# Patient Record
Sex: FEMALE | Race: WHITE | NOT HISPANIC OR LATINO | Employment: STUDENT | URBAN - METROPOLITAN AREA
[De-identification: names, ages, dates, MRNs, and addresses within clinical notes are randomized per-mention and may not be internally consistent; named-entity substitution may affect disease eponyms.]

---
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
## 2018-01-14 VITALS — HEART RATE: 80 BPM | TEMPERATURE: 98.1 F | RESPIRATION RATE: 16 BRPM | WEIGHT: 144.6 LBS

## 2018-06-04 RX ORDER — EPINEPHRINE 0.3 MG/.3ML
INJECTION SUBCUTANEOUS
COMMUNITY

## 2018-06-04 RX ORDER — ESCITALOPRAM OXALATE 20 MG/1
20 TABLET ORAL DAILY
Refills: 0 | COMMUNITY
Start: 2018-05-10 | End: 2021-12-23

## 2018-06-04 RX ORDER — LEVONORGESTREL AND ETHINYL ESTRADIOL 0.1-0.02MG
KIT ORAL
COMMUNITY
End: 2019-06-21 | Stop reason: ALTCHOICE

## 2018-06-04 RX ORDER — ALBUTEROL SULFATE 90 UG/1
AEROSOL, METERED RESPIRATORY (INHALATION)
COMMUNITY
End: 2018-06-05 | Stop reason: SDUPTHER

## 2018-06-05 ENCOUNTER — OFFICE VISIT (OUTPATIENT)
Dept: FAMILY MEDICINE CLINIC | Facility: CLINIC | Age: 18
End: 2018-06-05
Payer: COMMERCIAL

## 2018-06-05 VITALS
RESPIRATION RATE: 16 BRPM | HEART RATE: 96 BPM | SYSTOLIC BLOOD PRESSURE: 120 MMHG | HEIGHT: 62 IN | OXYGEN SATURATION: 97 % | BODY MASS INDEX: 30 KG/M2 | DIASTOLIC BLOOD PRESSURE: 84 MMHG | TEMPERATURE: 98.3 F | WEIGHT: 163 LBS

## 2018-06-05 DIAGNOSIS — R10.11 RIGHT UPPER QUADRANT ABDOMINAL PAIN: ICD-10-CM

## 2018-06-05 DIAGNOSIS — J45.990 EXERCISE-INDUCED ASTHMA: ICD-10-CM

## 2018-06-05 DIAGNOSIS — E66.3 OVERWEIGHT: Primary | ICD-10-CM

## 2018-06-05 DIAGNOSIS — L70.9 ACNE, UNSPECIFIED ACNE TYPE: ICD-10-CM

## 2018-06-05 DIAGNOSIS — G43.909 MIGRAINE WITHOUT STATUS MIGRAINOSUS, NOT INTRACTABLE, UNSPECIFIED MIGRAINE TYPE: ICD-10-CM

## 2018-06-05 PROCEDURE — 99204 OFFICE O/P NEW MOD 45 MIN: CPT | Performed by: INTERNAL MEDICINE

## 2018-06-05 PROCEDURE — 1036F TOBACCO NON-USER: CPT | Performed by: INTERNAL MEDICINE

## 2018-06-05 RX ORDER — ALBUTEROL SULFATE 90 UG/1
2 AEROSOL, METERED RESPIRATORY (INHALATION) EVERY 4 HOURS PRN
Qty: 18 G | Refills: 0 | Status: SHIPPED | OUTPATIENT
Start: 2018-06-05

## 2018-06-05 RX ORDER — ERYTHROMYCIN 20 MG/G
GEL TOPICAL 2 TIMES DAILY
Qty: 60 G | Refills: 2 | Status: SHIPPED | OUTPATIENT
Start: 2018-06-05 | End: 2019-01-10 | Stop reason: ALTCHOICE

## 2018-06-05 NOTE — PROGRESS NOTES
Assessment/Plan:         Diagnoses and all orders for this visit:    Overweight  Comments:  check lab/continue with weight watchers    Right upper quadrant abdominal pain  Comments:  consider u/s    Exercise-induced asthma  Comments:  prn albuteral  Orders:  -     albuterol (PROVENTIL HFA,VENTOLIN HFA) 90 mcg/act inhaler; Inhale 2 puffs every 4 (four) hours as needed for wheezing    Migraine without status migrainosus, not intractable, unspecified migraine type  Comments:  on sri/  will try advil or excedrin migrain with ppi    Acne, unspecified acne type  Comments:  e-mycin gel    Other orders  -     Discontinue: albuterol (PROVENTIL HFA,VENTOLIN HFA) 90 mcg/act inhaler; Inhale  -     EPINEPHrine (EPIPEN) 0 3 mg/0 3 mL SOAJ; Inject as directed  -     escitalopram (LEXAPRO) 20 mg tablet; Take 20 mg by mouth daily  -     levonorgestrel-ethinyl estradiol (FALMINA) 0 1-20 MG-MCG per tablet; Take by mouth  -     Multiple Vitamins-Minerals (MULTIVITAMIN GUMMIES ADULTS PO); Take by mouth  -     Cholecalciferol (VITAMIN D PO); Take by mouth          Subjective:      Patient ID: Gabriela Alcantar is a 21 y o  female  Pt complains of her stomach hurting after she eats  She is back on her weight watchers  She gained 25 lbs    +fatigued  Her pain after eating is between r u/l q  Also is getting neck pimples  Gets migrains at school but <3/month        The following portions of the patient's history were reviewed and updated as appropriate:   She  has a past medical history of Abnormal blood chemistry (07/04/2011); Allergic rhinitis; Dysuria; Exercise-induced asthma; Fatigue; Hemangioma of face (2006); Nasal fracture (2012); and Nevus of head (2006)  She There are no active problems to display for this patient  She  has a past surgical history that includes [place] tooth extraction    Her family history includes Anemia in her other; Breast cancer in her mother; Cancer in her other; Diabetes in her father and maternal grandmother; Heart disease in her father; Hyperlipidemia in her father; Hypertension in her father, maternal grandfather, maternal grandmother, and mother; Mental illness in her cousin; Migraines in her cousin and father; Other in her cousin  She  reports that she has never smoked  She has never used smokeless tobacco  She reports that she drinks alcohol  She reports that she does not use drugs  Current Outpatient Prescriptions   Medication Sig Dispense Refill    albuterol (PROVENTIL HFA,VENTOLIN HFA) 90 mcg/act inhaler Inhale 2 puffs every 4 (four) hours as needed for wheezing 18 g 0    Cholecalciferol (VITAMIN D PO) Take by mouth      EPINEPHrine (EPIPEN) 0 3 mg/0 3 mL SOAJ Inject as directed      escitalopram (LEXAPRO) 20 mg tablet Take 20 mg by mouth daily  0    levonorgestrel-ethinyl estradiol (FALMINA) 0 1-20 MG-MCG per tablet Take by mouth      Multiple Vitamins-Minerals (MULTIVITAMIN GUMMIES ADULTS PO) Take by mouth       No current facility-administered medications for this visit  No current outpatient prescriptions on file prior to visit  No current facility-administered medications on file prior to visit  She is allergic to dust mite extract; molds & smuts; nuts; other; and pollen extract       Review of Systems   Constitutional: Negative  HENT: Negative  Eyes: Negative  Respiratory: Negative  Cardiovascular: Negative  Gastrointestinal: Negative  Objective:      /84 (BP Location: Left arm, Patient Position: Sitting, Cuff Size: Standard)   Pulse 96   Temp 98 3 °F (36 8 °C) (Tympanic)   Resp 16   Ht 5' 2" (1 575 m)   Wt 73 9 kg (163 lb)   LMP 04/23/2018 (Exact Date)   SpO2 97%   BMI 29 81 kg/m²          Physical Exam   Constitutional: She appears well-developed and well-nourished  HENT:   Head: Normocephalic and atraumatic     Right Ear: External ear normal    Left Ear: External ear normal    Nose: Nose normal    Mouth/Throat: Oropharynx is clear and moist    Neck: Normal range of motion  Neck supple  Cardiovascular: Normal rate and regular rhythm      Pulmonary/Chest: Effort normal and breath sounds normal

## 2018-06-09 LAB
ALBUMIN SERPL-MCNC: 4.4 G/DL (ref 3.6–5.1)
ALBUMIN/GLOB SERPL: 1.5 (CALC) (ref 1–2.5)
ALP SERPL-CCNC: 44 U/L (ref 47–176)
ALT SERPL-CCNC: 15 U/L (ref 5–32)
AMYLASE SERPL-CCNC: 60 U/L (ref 21–101)
AST SERPL-CCNC: 17 U/L (ref 12–32)
BILIRUB SERPL-MCNC: 0.4 MG/DL (ref 0.2–1.1)
BUN SERPL-MCNC: 17 MG/DL (ref 7–20)
BUN/CREAT SERPL: ABNORMAL (CALC) (ref 6–22)
CALCIUM SERPL-MCNC: 9.5 MG/DL (ref 8.9–10.4)
CHLORIDE SERPL-SCNC: 106 MMOL/L (ref 98–110)
CHOLEST SERPL-MCNC: 197 MG/DL
CHOLEST/HDLC SERPL: 4.3 (CALC)
CO2 SERPL-SCNC: 23 MMOL/L (ref 20–31)
CREAT SERPL-MCNC: 0.91 MG/DL (ref 0.5–1)
ERYTHROCYTE [DISTWIDTH] IN BLOOD BY AUTOMATED COUNT: 12.8 % (ref 11–15)
EST. AVERAGE GLUCOSE BLD GHB EST-MCNC: 94 (CALC)
EST. AVERAGE GLUCOSE BLD GHB EST-SCNC: 5.2 (CALC)
GLOBULIN SER CALC-MCNC: 2.9 G/DL (CALC) (ref 2–3.8)
GLUCOSE SERPL-MCNC: 83 MG/DL (ref 65–99)
HBA1C MFR BLD: 4.9 % OF TOTAL HGB
HCT VFR BLD AUTO: 38.1 % (ref 35–45)
HDLC SERPL-MCNC: 46 MG/DL
HGB BLD-MCNC: 12.4 G/DL (ref 11.7–15.5)
LDLC SERPL CALC-MCNC: 110 MG/DL (CALC)
MCH RBC QN AUTO: 29.2 PG (ref 27–33)
MCHC RBC AUTO-ENTMCNC: 32.5 G/DL (ref 32–36)
MCV RBC AUTO: 89.9 FL (ref 80–100)
NONHDLC SERPL-MCNC: 151 MG/DL (CALC)
PLATELET # BLD AUTO: 211 THOUSAND/UL (ref 140–400)
PMV BLD REES-ECKER: 10.4 FL (ref 7.5–12.5)
POTASSIUM SERPL-SCNC: 4.1 MMOL/L (ref 3.8–5.1)
PROT SERPL-MCNC: 7.3 G/DL (ref 6.3–8.2)
RBC # BLD AUTO: 4.24 MILLION/UL (ref 3.8–5.1)
SL AMB EGFR AFRICAN AMERICAN: 106 ML/MIN/1.73M2
SL AMB EGFR NON AFRICAN AMERICAN: 91 ML/MIN/1.73M2
SODIUM SERPL-SCNC: 138 MMOL/L (ref 135–146)
T4 FREE SERPL-MCNC: 1.1 NG/DL (ref 0.8–1.4)
TRIGL SERPL-MCNC: 311 MG/DL
WBC # BLD AUTO: 5.8 THOUSAND/UL (ref 3.8–10.8)

## 2018-06-11 ENCOUNTER — TELEPHONE (OUTPATIENT)
Dept: FAMILY MEDICINE CLINIC | Facility: CLINIC | Age: 18
End: 2018-06-11

## 2018-06-12 ENCOUNTER — HOSPITAL ENCOUNTER (OUTPATIENT)
Dept: RADIOLOGY | Facility: MEDICAL CENTER | Age: 18
Discharge: HOME | End: 2018-06-12
Payer: COMMERCIAL

## 2018-06-12 DIAGNOSIS — R10.11 RIGHT UPPER QUADRANT ABDOMINAL PAIN: ICD-10-CM

## 2018-06-12 PROCEDURE — 76705 ECHO EXAM OF ABDOMEN: CPT

## 2018-07-17 ENCOUNTER — OFFICE VISIT (OUTPATIENT)
Dept: FAMILY MEDICINE CLINIC | Facility: CLINIC | Age: 18
End: 2018-07-17
Payer: COMMERCIAL

## 2018-07-17 VITALS
DIASTOLIC BLOOD PRESSURE: 76 MMHG | WEIGHT: 162 LBS | RESPIRATION RATE: 16 BRPM | TEMPERATURE: 98.1 F | SYSTOLIC BLOOD PRESSURE: 120 MMHG | HEIGHT: 62 IN | HEART RATE: 94 BPM | OXYGEN SATURATION: 98 % | BODY MASS INDEX: 29.81 KG/M2

## 2018-07-17 DIAGNOSIS — L02.91 ABSCESS: Primary | ICD-10-CM

## 2018-07-17 PROCEDURE — 3008F BODY MASS INDEX DOCD: CPT | Performed by: INTERNAL MEDICINE

## 2018-07-17 PROCEDURE — 99213 OFFICE O/P EST LOW 20 MIN: CPT | Performed by: INTERNAL MEDICINE

## 2018-07-17 RX ORDER — CLINDAMYCIN HYDROCHLORIDE 300 MG/1
300 CAPSULE ORAL 3 TIMES DAILY
Qty: 30 CAPSULE | Refills: 0 | Status: SHIPPED | OUTPATIENT
Start: 2018-07-17 | End: 2018-07-27

## 2018-07-17 NOTE — PROGRESS NOTES
Assessment/Plan:         Diagnoses and all orders for this visit:    Abscess  -     clindamycin (CLEOCIN) 300 MG capsule; Take 1 capsule (300 mg total) by mouth 3 (three) times a day for 10 days    Other orders  -     Omega-3 Fatty Acids (FISH OIL PO); Take by mouth          Subjective:      Patient ID: Ann Teran is a 21 y o  female  Pt developed abscess 3 days ago  The following portions of the patient's history were reviewed and updated as appropriate:   She  has a past medical history of Abnormal blood chemistry (07/04/2011); Allergic rhinitis; Dysuria; Exercise-induced asthma; Fatigue; Hemangioma of face (2006); Nasal fracture (2012); and Nevus of head (2006)  She There are no active problems to display for this patient  She  has a past surgical history that includes [place] tooth extraction  Her family history includes Anemia in her other; Breast cancer in her mother; Cancer in her other; Diabetes in her father and maternal grandmother; Heart disease in her father; Hyperlipidemia in her father; Hypertension in her father, maternal grandfather, maternal grandmother, and mother; Mental illness in her cousin; Migraines in her cousin and father; Other in her cousin  She  reports that she has never smoked  She has never used smokeless tobacco  She reports that she drinks alcohol  She reports that she does not use drugs    Current Outpatient Prescriptions   Medication Sig Dispense Refill    albuterol (PROVENTIL HFA,VENTOLIN HFA) 90 mcg/act inhaler Inhale 2 puffs every 4 (four) hours as needed for wheezing 18 g 0    Cholecalciferol (VITAMIN D PO) Take by mouth      EPINEPHrine (EPIPEN) 0 3 mg/0 3 mL SOAJ Inject as directed      escitalopram (LEXAPRO) 20 mg tablet Take 20 mg by mouth daily  0    levonorgestrel-ethinyl estradiol (FALMINA) 0 1-20 MG-MCG per tablet Take by mouth      Multiple Vitamins-Minerals (MULTIVITAMIN GUMMIES ADULTS PO) Take by mouth      Omega-3 Fatty Acids (FISH OIL PO) Take by mouth      clindamycin (CLEOCIN) 300 MG capsule Take 1 capsule (300 mg total) by mouth 3 (three) times a day for 10 days 30 capsule 0    erythromycin with ethanol (EMGEL) 2 % gel Apply topically 2 (two) times a day 60 g 2     No current facility-administered medications for this visit  Current Outpatient Prescriptions on File Prior to Visit   Medication Sig    albuterol (PROVENTIL HFA,VENTOLIN HFA) 90 mcg/act inhaler Inhale 2 puffs every 4 (four) hours as needed for wheezing    Cholecalciferol (VITAMIN D PO) Take by mouth    EPINEPHrine (EPIPEN) 0 3 mg/0 3 mL SOAJ Inject as directed    escitalopram (LEXAPRO) 20 mg tablet Take 20 mg by mouth daily    levonorgestrel-ethinyl estradiol (FALMINA) 0 1-20 MG-MCG per tablet Take by mouth    Multiple Vitamins-Minerals (MULTIVITAMIN GUMMIES ADULTS PO) Take by mouth    erythromycin with ethanol (EMGEL) 2 % gel Apply topically 2 (two) times a day     No current facility-administered medications on file prior to visit  She is allergic to dust mite extract; molds & smuts; nuts; other; and pollen extract       Review of Systems   Constitutional: Negative  HENT: Negative  Respiratory: Negative  Cardiovascular: Negative  Gastrointestinal: Negative  Objective:      /76 (BP Location: Left arm, Patient Position: Sitting, Cuff Size: Standard)   Pulse 94   Temp 98 1 °F (36 7 °C) (Tympanic)   Resp 16   Ht 5' 2" (1 575 m)   Wt 73 5 kg (162 lb)   LMP 07/11/2018 (Exact Date)   SpO2 98%   BMI 29 63 kg/m²          Physical Exam   Constitutional: She appears well-developed and well-nourished  HENT:   Head: Normocephalic and atraumatic  Right Ear: External ear normal    Left Ear: External ear normal    Nose: Nose normal    Mouth/Throat: Oropharynx is clear and moist    Neck: Normal range of motion  Neck supple  Cardiovascular: Normal rate, regular rhythm and normal heart sounds      Pulmonary/Chest: Effort normal and breath sounds normal    Skin:   Left axillary abscess punctured with steril needle and some drainage

## 2018-09-27 ENCOUNTER — TELEPHONE (OUTPATIENT)
Dept: FAMILY MEDICINE CLINIC | Facility: CLINIC | Age: 18
End: 2018-09-27

## 2018-09-27 NOTE — TELEPHONE ENCOUNTER
Patients mom called and was emailed a copy of the patients labs and is concerned because she was told the labs were ok, but the lipid labs were elevated and she wants to know why they were told everything is ok

## 2018-11-30 ENCOUNTER — OFFICE VISIT (OUTPATIENT)
Dept: FAMILY MEDICINE CLINIC | Facility: CLINIC | Age: 18
End: 2018-11-30
Payer: COMMERCIAL

## 2018-11-30 VITALS
DIASTOLIC BLOOD PRESSURE: 78 MMHG | RESPIRATION RATE: 16 BRPM | SYSTOLIC BLOOD PRESSURE: 108 MMHG | BODY MASS INDEX: 32.2 KG/M2 | TEMPERATURE: 97.4 F | OXYGEN SATURATION: 98 % | HEART RATE: 99 BPM | WEIGHT: 175 LBS | HEIGHT: 62 IN

## 2018-11-30 DIAGNOSIS — J02.9 PHARYNGITIS, UNSPECIFIED ETIOLOGY: Primary | ICD-10-CM

## 2018-11-30 PROCEDURE — 3008F BODY MASS INDEX DOCD: CPT | Performed by: INTERNAL MEDICINE

## 2018-11-30 PROCEDURE — 99213 OFFICE O/P EST LOW 20 MIN: CPT | Performed by: INTERNAL MEDICINE

## 2018-11-30 RX ORDER — PROPRANOLOL HYDROCHLORIDE 10 MG/1
TABLET ORAL
COMMUNITY
Start: 2018-11-29 | End: 2021-07-20

## 2018-11-30 RX ORDER — CLARITHROMYCIN 500 MG/1
500 TABLET, COATED ORAL EVERY 12 HOURS SCHEDULED
Qty: 20 TABLET | Refills: 0 | Status: SHIPPED | OUTPATIENT
Start: 2018-11-30 | End: 2018-12-10

## 2018-11-30 RX ORDER — AMOXICILLIN 250 MG/1
500 CAPSULE ORAL EVERY 12 HOURS SCHEDULED
COMMUNITY
End: 2019-01-10 | Stop reason: ALTCHOICE

## 2018-11-30 NOTE — PROGRESS NOTES
Assessment/Plan:         Diagnoses and all orders for this visit:    Pharyngitis, unspecified etiology  -     clarithromycin (BIAXIN) 500 mg tablet; Take 1 tablet (500 mg total) by mouth every 12 (twelve) hours for 10 days    Other orders  -     amoxicillin (AMOXIL) 250 mg capsule; Take 500 mg by mouth every 12 (twelve) hours  -     propranolol (INDERAL) 10 mg tablet;           Subjective:      Patient ID: Vicente Ross is a 19 y o  female  Pt complains of being sick >1 week   +chills +sore throat +hoarse+ear ache +pressure in forehead and +h/a +sob +cough with walking +wheeze temp 100 4  Denies preg  The following portions of the patient's history were reviewed and updated as appropriate:   She  has a past medical history of Abnormal blood chemistry (07/04/2011); Allergic rhinitis; Dysuria; Exercise-induced asthma; Fatigue; Hemangioma of face (2006); Nasal fracture (2012); and Nevus of head (2006)  She There are no active problems to display for this patient  She  has a past surgical history that includes [place] tooth extraction  Her family history includes Anemia in her other; Breast cancer in her mother; Cancer in her other; Diabetes in her father and maternal grandmother; Heart disease in her father; Hyperlipidemia in her father; Hypertension in her father, maternal grandfather, maternal grandmother, and mother; Mental illness in her cousin; Migraines in her cousin and father; Other in her cousin  She  reports that she has never smoked  She has never used smokeless tobacco  She reports that she drinks alcohol  She reports that she does not use drugs    Current Outpatient Prescriptions   Medication Sig Dispense Refill    albuterol (PROVENTIL HFA,VENTOLIN HFA) 90 mcg/act inhaler Inhale 2 puffs every 4 (four) hours as needed for wheezing 18 g 0    amoxicillin (AMOXIL) 250 mg capsule Take 500 mg by mouth every 12 (twelve) hours      Cholecalciferol (VITAMIN D PO) Take by mouth      EPINEPHrine (EPIPEN) 0 3 mg/0 3 mL SOAJ Inject as directed      escitalopram (LEXAPRO) 20 mg tablet Take 20 mg by mouth daily  0    levonorgestrel-ethinyl estradiol (FALMINA) 0 1-20 MG-MCG per tablet Take by mouth      Multiple Vitamins-Minerals (MULTIVITAMIN GUMMIES ADULTS PO) Take by mouth      Omega-3 Fatty Acids (FISH OIL PO) Take by mouth      clarithromycin (BIAXIN) 500 mg tablet Take 1 tablet (500 mg total) by mouth every 12 (twelve) hours for 10 days 20 tablet 0    erythromycin with ethanol (EMGEL) 2 % gel Apply topically 2 (two) times a day (Patient not taking: Reported on 11/30/2018 ) 60 g 2    propranolol (INDERAL) 10 mg tablet        No current facility-administered medications for this visit  Current Outpatient Prescriptions on File Prior to Visit   Medication Sig    albuterol (PROVENTIL HFA,VENTOLIN HFA) 90 mcg/act inhaler Inhale 2 puffs every 4 (four) hours as needed for wheezing    Cholecalciferol (VITAMIN D PO) Take by mouth    EPINEPHrine (EPIPEN) 0 3 mg/0 3 mL SOAJ Inject as directed    escitalopram (LEXAPRO) 20 mg tablet Take 20 mg by mouth daily    levonorgestrel-ethinyl estradiol (FALMINA) 0 1-20 MG-MCG per tablet Take by mouth    Multiple Vitamins-Minerals (MULTIVITAMIN GUMMIES ADULTS PO) Take by mouth    Omega-3 Fatty Acids (FISH OIL PO) Take by mouth    erythromycin with ethanol (EMGEL) 2 % gel Apply topically 2 (two) times a day (Patient not taking: Reported on 11/30/2018 )     No current facility-administered medications on file prior to visit  She is allergic to dust mite extract; molds & smuts; nuts; other; and pollen extract       Review of Systems   Constitutional: Positive for chills and fever  HENT: Positive for congestion, ear pain, sore throat and voice change  Negative for postnasal drip  Respiratory: Negative for cough  Cardiovascular: Negative  Neurological: Positive for headaches           Objective:      /78 (BP Location: Left arm, Patient Position: Sitting, Cuff Size: Large)   Pulse 99   Temp (!) 97 4 °F (36 3 °C) (Tympanic)   Resp 16   Ht 5' 2" (1 575 m)   Wt 79 4 kg (175 lb)   LMP 11/29/2018 (Exact Date)   SpO2 98%   BMI 32 01 kg/m²          Physical Exam   Constitutional: She appears well-developed and well-nourished  HENT:   Head: Normocephalic and atraumatic  Right Ear: External ear normal    Left Ear: External ear normal    Mouth/Throat: Oropharynx is clear and moist    Neck: Normal range of motion  Neck supple  Cardiovascular: Normal rate and regular rhythm

## 2018-12-20 ENCOUNTER — TELEPHONE (OUTPATIENT)
Dept: FAMILY MEDICINE CLINIC | Facility: CLINIC | Age: 18
End: 2018-12-20

## 2018-12-20 DIAGNOSIS — T75.3XXA SEASICKNESS, INITIAL ENCOUNTER: Primary | ICD-10-CM

## 2018-12-20 RX ORDER — SCOLOPAMINE TRANSDERMAL SYSTEM 1 MG/1
1 PATCH, EXTENDED RELEASE TRANSDERMAL
Qty: 4 PATCH | Refills: 0 | Status: SHIPPED | OUTPATIENT
Start: 2018-12-20 | End: 2019-01-10 | Stop reason: ALTCHOICE

## 2018-12-20 NOTE — TELEPHONE ENCOUNTER
Mom called requesting Rx for motion sickness  Leaving for cruise 12/22/18    Please send to [facility]

## 2019-01-10 ENCOUNTER — OFFICE VISIT (OUTPATIENT)
Dept: FAMILY MEDICINE CLINIC | Facility: CLINIC | Age: 19
End: 2019-01-10
Payer: COMMERCIAL

## 2019-01-10 VITALS
HEART RATE: 111 BPM | DIASTOLIC BLOOD PRESSURE: 78 MMHG | BODY MASS INDEX: 33.75 KG/M2 | HEIGHT: 62 IN | TEMPERATURE: 97.9 F | SYSTOLIC BLOOD PRESSURE: 128 MMHG | OXYGEN SATURATION: 98 % | WEIGHT: 183.4 LBS

## 2019-01-10 DIAGNOSIS — J01.00 ACUTE MAXILLARY SINUSITIS, RECURRENCE NOT SPECIFIED: Primary | ICD-10-CM

## 2019-01-10 DIAGNOSIS — J30.9 ALLERGIC RHINITIS, UNSPECIFIED SEASONALITY, UNSPECIFIED TRIGGER: ICD-10-CM

## 2019-01-10 DIAGNOSIS — J45.990 EXERCISE-INDUCED ASTHMA: ICD-10-CM

## 2019-01-10 PROBLEM — R30.0 DYSURIA: Status: RESOLVED | Noted: 2019-01-10 | Resolved: 2019-01-10

## 2019-01-10 PROBLEM — T75.3XXA SEASICKNESS, INITIAL ENCOUNTER: Status: RESOLVED | Noted: 2018-12-20 | Resolved: 2019-01-10

## 2019-01-10 PROBLEM — R53.83 FATIGUE: Status: RESOLVED | Noted: 2019-01-10 | Resolved: 2019-01-10

## 2019-01-10 PROCEDURE — 99214 OFFICE O/P EST MOD 30 MIN: CPT | Performed by: PHYSICIAN ASSISTANT

## 2019-01-10 PROCEDURE — 3008F BODY MASS INDEX DOCD: CPT | Performed by: PHYSICIAN ASSISTANT

## 2019-01-10 PROCEDURE — 1036F TOBACCO NON-USER: CPT | Performed by: PHYSICIAN ASSISTANT

## 2019-01-10 RX ORDER — CEFPROZIL 500 MG/1
500 TABLET, FILM COATED ORAL 2 TIMES DAILY
Qty: 20 TABLET | Refills: 0 | Status: SHIPPED | OUTPATIENT
Start: 2019-01-10 | End: 2019-01-20

## 2019-01-10 RX ORDER — MOMETASONE FUROATE 50 UG/1
2 SPRAY, METERED NASAL DAILY
Qty: 1 ACT | Refills: 3 | Status: SHIPPED | OUTPATIENT
Start: 2019-01-10 | End: 2019-04-02 | Stop reason: SDUPTHER

## 2019-01-10 NOTE — PROGRESS NOTES
Assessment/Plan:     Diagnoses and all orders for this visit:    Acute maxillary sinusitis, recurrence not specified  Comments:  P  O  cefzil to ordered  Orders:  -     cefprosil (CEFZIL) 500 MG tablet; Take 1 tablet (500 mg total) by mouth 2 (two) times a day for 10 days    Allergic rhinitis, unspecified seasonality, unspecified trigger  Comments:  Patient to take over-the-counter Zyrtec daily  Will start Flonase  Orders:  -     mometasone (NASONEX) 50 mcg/act nasal spray; 2 sprays into each nostril daily    Exercise-induced asthma  Comments:  Patient to use her rescue inhaler when she has coughing fit  Subjective:      Patient ID: Gian Walters is a 19 y o  female  Patient presents with upper respiratory symptoms  Patient has been sick since November  She was treated with amoxicillin  Patient states she gets better and then symptoms returned stuffy nose sore throat short of breath cough ears clogged sinus headache and pressure  Patient currently has stuffy runny nose ear congestion sinus congestion  Patient also has a sore throat on the left side  Some cough and shortness of breath  Patient has known allergic rhinitis and exercise-induced asthma  Patient not using her inhaler regularly  The following portions of the patient's history were reviewed and updated as appropriate:   She  has a past medical history of Abnormal blood chemistry (07/04/2011); Allergic rhinitis; Dysuria; Exercise-induced asthma; Fatigue; Hemangioma of face (2006); Nasal fracture (2012); and Nevus of head (2006)    She   Patient Active Problem List    Diagnosis Date Noted    Allergic rhinitis 01/10/2019    Exercise-induced asthma 01/10/2019    Acute maxillary sinusitis 01/10/2019     Current Outpatient Prescriptions   Medication Sig Dispense Refill    albuterol (PROVENTIL HFA,VENTOLIN HFA) 90 mcg/act inhaler Inhale 2 puffs every 4 (four) hours as needed for wheezing 18 g 0    cefprosil (CEFZIL) 500 MG tablet Take 1 tablet (500 mg total) by mouth 2 (two) times a day for 10 days 20 tablet 0    Cholecalciferol (VITAMIN D PO) Take by mouth      EPINEPHrine (EPIPEN) 0 3 mg/0 3 mL SOAJ Inject as directed      escitalopram (LEXAPRO) 20 mg tablet Take 20 mg by mouth daily  0    levonorgestrel-ethinyl estradiol (FALMINA) 0 1-20 MG-MCG per tablet Take by mouth      mometasone (NASONEX) 50 mcg/act nasal spray 2 sprays into each nostril daily 1 Act 3    Multiple Vitamins-Minerals (MULTIVITAMIN GUMMIES ADULTS PO) Take by mouth      Omega-3 Fatty Acids (FISH OIL PO) Take by mouth      propranolol (INDERAL) 10 mg tablet        No current facility-administered medications for this visit  Current Outpatient Prescriptions on File Prior to Visit   Medication Sig    albuterol (PROVENTIL HFA,VENTOLIN HFA) 90 mcg/act inhaler Inhale 2 puffs every 4 (four) hours as needed for wheezing    Cholecalciferol (VITAMIN D PO) Take by mouth    EPINEPHrine (EPIPEN) 0 3 mg/0 3 mL SOAJ Inject as directed    escitalopram (LEXAPRO) 20 mg tablet Take 20 mg by mouth daily    levonorgestrel-ethinyl estradiol (FALMINA) 0 1-20 MG-MCG per tablet Take by mouth    Multiple Vitamins-Minerals (MULTIVITAMIN GUMMIES ADULTS PO) Take by mouth    Omega-3 Fatty Acids (FISH OIL PO) Take by mouth    propranolol (INDERAL) 10 mg tablet     [DISCONTINUED] amoxicillin (AMOXIL) 250 mg capsule Take 500 mg by mouth every 12 (twelve) hours    [DISCONTINUED] erythromycin with ethanol (EMGEL) 2 % gel Apply topically 2 (two) times a day (Patient not taking: Reported on 11/30/2018 )    [DISCONTINUED] scopolamine (TRANSDERM-SCOP) 1 5 mg/3 days TD 72 hr patch Place 1 patch on the skin every third day     No current facility-administered medications on file prior to visit  She is allergic to dust mite extract; molds & smuts; nuts; other; and pollen extract       Review of Systems   Constitutional: Positive for fatigue   Negative for activity change, appetite change, chills and fever  HENT: Positive for ear pain, postnasal drip, rhinorrhea, sinus pain, sinus pressure and sore throat  Eyes: Negative for visual disturbance  Respiratory: Positive for cough  Skin: Negative for rash  Objective:        Physical Exam   Constitutional: She is oriented to person, place, and time  She appears well-developed and well-nourished  No distress  HENT:   Head: Normocephalic and atraumatic  Right Ear: Tympanic membrane, external ear and ear canal normal    Left Ear: Tympanic membrane, external ear and ear canal normal    Nose: Right sinus exhibits maxillary sinus tenderness  Right sinus exhibits no frontal sinus tenderness  Left sinus exhibits maxillary sinus tenderness  Left sinus exhibits no frontal sinus tenderness  Mouth/Throat: Posterior oropharyngeal erythema present  No oropharyngeal exudate  Patient has post pharyngeal cobblestoning   Eyes: Pupils are equal, round, and reactive to light  Conjunctivae are normal    Neck: Normal range of motion  Neck supple  Carotid bruit is not present  No thyromegaly present  Cardiovascular: Normal rate and regular rhythm  Exam reveals no gallop and no friction rub  No murmur heard  Pulmonary/Chest: Effort normal and breath sounds normal  No respiratory distress  She has no wheezes  No wheezes course bronchial vesicular sounds with no egophony   Musculoskeletal: Normal range of motion  She exhibits no edema  Lymphadenopathy:     She has cervical adenopathy  Neurological: She is alert and oriented to person, place, and time  Skin: Skin is warm and dry  No rash noted  She is not diaphoretic  No erythema  Psychiatric: She has a normal mood and affect  Her behavior is normal  Judgment and thought content normal    Nursing note and vitals reviewed

## 2019-04-02 DIAGNOSIS — J30.9 ALLERGIC RHINITIS, UNSPECIFIED SEASONALITY, UNSPECIFIED TRIGGER: ICD-10-CM

## 2019-04-02 RX ORDER — MOMETASONE FUROATE 50 UG/1
SPRAY, METERED NASAL
Qty: 1 ACT | Refills: 2 | Status: SHIPPED | OUTPATIENT
Start: 2019-04-02

## 2019-06-21 ENCOUNTER — OFFICE VISIT (OUTPATIENT)
Dept: FAMILY MEDICINE CLINIC | Facility: CLINIC | Age: 19
End: 2019-06-21
Payer: COMMERCIAL

## 2019-06-21 VITALS
BODY MASS INDEX: 34.6 KG/M2 | RESPIRATION RATE: 16 BRPM | SYSTOLIC BLOOD PRESSURE: 130 MMHG | WEIGHT: 188 LBS | HEIGHT: 62 IN | OXYGEN SATURATION: 96 % | HEART RATE: 101 BPM | TEMPERATURE: 97.9 F | DIASTOLIC BLOOD PRESSURE: 80 MMHG

## 2019-06-21 DIAGNOSIS — J06.9 UPPER RESPIRATORY TRACT INFECTION, UNSPECIFIED TYPE: Primary | ICD-10-CM

## 2019-06-21 DIAGNOSIS — N91.2 AMENORRHEA: ICD-10-CM

## 2019-06-21 DIAGNOSIS — H61.91 DISORDER OF RIGHT EAR LOBE: ICD-10-CM

## 2019-06-21 PROBLEM — J01.00 ACUTE MAXILLARY SINUSITIS: Status: RESOLVED | Noted: 2019-01-10 | Resolved: 2019-06-21

## 2019-06-21 LAB — SL AMB POCT URINE HCG: NEGATIVE

## 2019-06-21 PROCEDURE — 81025 URINE PREGNANCY TEST: CPT | Performed by: PHYSICIAN ASSISTANT

## 2019-06-21 PROCEDURE — 3008F BODY MASS INDEX DOCD: CPT | Performed by: PHYSICIAN ASSISTANT

## 2019-06-21 PROCEDURE — 99214 OFFICE O/P EST MOD 30 MIN: CPT | Performed by: PHYSICIAN ASSISTANT

## 2019-06-21 PROCEDURE — 1036F TOBACCO NON-USER: CPT | Performed by: PHYSICIAN ASSISTANT

## 2019-06-21 RX ORDER — NORETHINDRONE ACETATE AND ETHINYL ESTRADIOL 1MG-20(24)
KIT ORAL
COMMUNITY
Start: 2019-06-20

## 2019-06-21 RX ORDER — CEPHALEXIN 500 MG/1
500 CAPSULE ORAL EVERY 12 HOURS SCHEDULED
Qty: 20 CAPSULE | Refills: 0 | Status: SHIPPED | OUTPATIENT
Start: 2019-06-21 | End: 2019-07-01

## 2019-06-21 RX ORDER — BUSPIRONE HYDROCHLORIDE 10 MG/1
10 TABLET ORAL 3 TIMES DAILY
COMMUNITY
End: 2021-07-20

## 2019-08-02 ENCOUNTER — OFFICE VISIT (OUTPATIENT)
Dept: FAMILY MEDICINE CLINIC | Facility: CLINIC | Age: 19
End: 2019-08-02
Payer: COMMERCIAL

## 2019-08-02 VITALS
WEIGHT: 183 LBS | SYSTOLIC BLOOD PRESSURE: 110 MMHG | HEART RATE: 95 BPM | HEIGHT: 62 IN | DIASTOLIC BLOOD PRESSURE: 74 MMHG | OXYGEN SATURATION: 98 % | RESPIRATION RATE: 16 BRPM | BODY MASS INDEX: 33.68 KG/M2 | TEMPERATURE: 96.4 F

## 2019-08-02 DIAGNOSIS — Z00.00 ANNUAL PHYSICAL EXAM: Primary | ICD-10-CM

## 2019-08-02 PROCEDURE — 92551 PURE TONE HEARING TEST AIR: CPT | Performed by: INTERNAL MEDICINE

## 2019-08-02 PROCEDURE — 99395 PREV VISIT EST AGE 18-39: CPT | Performed by: INTERNAL MEDICINE

## 2019-08-02 PROCEDURE — 99173 VISUAL ACUITY SCREEN: CPT | Performed by: INTERNAL MEDICINE

## 2019-08-02 PROCEDURE — 86580 TB INTRADERMAL TEST: CPT

## 2019-08-02 NOTE — PROGRESS NOTES
ADULT ANNUAL PHYSICAL  [facility]    NAME: Ned Mistry  AGE: 19 y o  SEX: female  : 2000     DATE: 2019     Assessment and Plan:     Problem List Items Addressed This Visit     None      Visit Diagnoses     Annual physical exam    -  Primary    Relevant Orders    TB Skin Test (Completed)          Immunizations and preventive care screenings were discussed with patient today  Appropriate education was printed on patient's after visit summary  Counseling:  BMI Counseling: Body mass index is 33 47 kg/m²  Discussed the patient's BMI with her  The BMI is above average  BMI counseling and education was provided to the patient  Nutrition recommendations include reducing portion sizes and decreasing overall calorie intake  Exercise recommendations include exercising 3-5 times per week  · Depression Screening: Unable to be performed due to medical contraindication  Pt sees psychiatry    No follow-ups on file  Chief Complaint:     Chief Complaint   Patient presents with    Annual Exam     forms for [employer] school      History of Present Illness:     Adult Annual Physical   Patient here for a comprehensive physical exam  The patient reports no problems  Diet and Physical Activity  · Diet/Nutrition: well balanced diet  · Exercise: walking  Depression Screening  PHQ-9 Depression Screening    PHQ-9:    Frequency of the following problems over the past two weeks:            General Health  · Sleep: gets 7-8 hours of sleep on average  · Hearing: normal - bilateral   · Vision: no vision problems  · Dental: regular dental visits  /GYN Health  · Last menstrual period:   · Contraceptive method: oral contraceptives  · History of STDs?: no      Review of Systems:     Review of Systems   Constitutional: Negative  HENT: Negative  Eyes: Negative for visual disturbance  Respiratory: Negative  Cardiovascular: Negative      Neurological: Negative for headaches        Past Medical History:     Past Medical History:   Diagnosis Date    Abnormal blood chemistry 07/04/2011    lavinia [place]     Allergic rhinitis     Dysuria     urinary    Exercise-induced asthma     Fatigue     last assessed 5/5/15; resolved 1/2/16    Hemangioma of face 2006    chin     Nasal fracture 2012    Nevus of head 2006    left cheek      Past Surgical History:     Past Surgical History:   Procedure Laterality Date    WISDOM TOOTH EXTRACTION        Social History:     Social History     Socioeconomic History    Marital status: Single     Spouse name: Not on file    Number of children: Not on file    Years of education: Not on file    Highest education level: Not on file   Occupational History    Not on file   Social Needs    Financial resource strain: Not on file    Food insecurity:     Worry: Not on file     Inability: Not on file    Transportation needs:     Medical: Not on file     Non-medical: Not on file   Tobacco Use    Smoking status: Never Smoker    Smokeless tobacco: Never Used   Substance and Sexual Activity    Alcohol use: Yes     Comment: rare    Drug use: No    Sexual activity: Never   Lifestyle    Physical activity:     Days per week: Not on file     Minutes per session: Not on file    Stress: Not on file   Relationships    Social connections:     Talks on phone: Not on file     Gets together: Not on file     Attends Sabianism service: Not on file     Active member of club or organization: Not on file     Attends meetings of clubs or organizations: Not on file     Relationship status: Not on file    Intimate partner violence:     Fear of current or ex partner: Not on file     Emotionally abused: Not on file     Physically abused: Not on file     Forced sexual activity: Not on file   Other Topics Concern    Not on file   Social History Narrative    Activities - cheerleading       Family History:     Family History   Problem Relation Age of Onset    Hypertension Mother     Breast cancer Mother     Heart disease Father         cardiac disorder     Diabetes Father     Hypertension Father     Migraines Father     Hyperlipidemia Father     Diabetes Maternal Grandmother     Hypertension Maternal Grandmother     Hypertension Maternal Grandfather     Anemia Other     Cancer Other     Other Cousin         aids    Mental illness Cousin         retardation    Migraines Cousin       Current Medications:     Current Outpatient Medications   Medication Sig Dispense Refill    albuterol (PROVENTIL HFA,VENTOLIN HFA) 90 mcg/act inhaler Inhale 2 puffs every 4 (four) hours as needed for wheezing 18 g 0    BLISOVI 24 FE 1-20 MG-MCG(24) per tablet       busPIRone (BUSPAR) 10 mg tablet Take 10 mg by mouth 3 (three) times a day      Cholecalciferol (VITAMIN D PO) Take by mouth      EPINEPHrine (EPIPEN) 0 3 mg/0 3 mL SOAJ Inject as directed      escitalopram (LEXAPRO) 20 mg tablet Take 20 mg by mouth daily  0    mometasone (NASONEX) 50 mcg/act nasal spray SPRAY 2 SPRAYS INTO EACH NOSTRIL EVERY DAY 1 Act 2    Multiple Vitamins-Minerals (MULTIVITAMIN GUMMIES ADULTS PO) Take by mouth      Omega-3 Fatty Acids (FISH OIL PO) Take by mouth      propranolol (INDERAL) 10 mg tablet        No current facility-administered medications for this visit  Allergies: Allergies   Allergen Reactions    Dust Mite Extract     Molds & Smuts     Nuts     Other     Pollen Extract       Physical Exam:     /74 (BP Location: Left arm, Patient Position: Sitting, Cuff Size: Large)   Pulse 95   Temp (!) 96 4 °F (35 8 °C) (Tympanic)   Resp 16   Ht 5' 2" (1 575 m)   Wt 83 kg (183 lb)   LMP 07/04/2019 (Approximate)   SpO2 98%   BMI 33 47 kg/m²     Physical Exam   Constitutional: She appears well-developed and well-nourished  No distress  HENT:   Head: Normocephalic     Right Ear: External ear normal    Left Ear: External ear normal  Nose: Nose normal    Mouth/Throat: Oropharynx is clear and moist  No oropharyngeal exudate  Neck: Normal range of motion  Neck supple  No thyromegaly present  Cardiovascular: Normal rate, regular rhythm, normal heart sounds and intact distal pulses  Exam reveals no gallop and no friction rub  No murmur heard  Pulmonary/Chest: Effort normal and breath sounds normal  No respiratory distress  She has no wheezes  She has no rales  She exhibits no tenderness  Abdominal: Soft  Bowel sounds are normal  She exhibits no distension and no mass  There is no tenderness  There is no rebound and no guarding  Lymphadenopathy:     She has no cervical adenopathy  Skin: She is not diaphoretic         DO Kushal Sánchez

## 2019-08-02 NOTE — PATIENT INSTRUCTIONS

## 2019-08-09 ENCOUNTER — CLINICAL SUPPORT (OUTPATIENT)
Dept: FAMILY MEDICINE CLINIC | Facility: CLINIC | Age: 19
End: 2019-08-09
Payer: COMMERCIAL

## 2019-08-09 DIAGNOSIS — Z11.1 SCREENING FOR TUBERCULOSIS: Primary | ICD-10-CM

## 2019-08-09 PROCEDURE — 86580 TB INTRADERMAL TEST: CPT

## 2019-10-09 RX ORDER — ESCITALOPRAM OXALATE 20 MG/1
TABLET ORAL
Qty: 30 TABLET | Refills: 0 | OUTPATIENT
Start: 2019-10-09

## 2020-01-09 PROBLEM — J06.9 UPPER RESPIRATORY INFECTION: Status: RESOLVED | Noted: 2019-06-21 | Resolved: 2020-01-09

## 2020-01-10 ENCOUNTER — OFFICE VISIT (OUTPATIENT)
Dept: FAMILY MEDICINE CLINIC | Facility: CLINIC | Age: 20
End: 2020-01-10
Payer: COMMERCIAL

## 2020-01-10 VITALS
RESPIRATION RATE: 18 BRPM | SYSTOLIC BLOOD PRESSURE: 140 MMHG | DIASTOLIC BLOOD PRESSURE: 90 MMHG | TEMPERATURE: 97.2 F | HEART RATE: 112 BPM | OXYGEN SATURATION: 98 %

## 2020-01-10 DIAGNOSIS — J02.9 PHARYNGITIS, UNSPECIFIED ETIOLOGY: Primary | ICD-10-CM

## 2020-01-10 LAB — S PYO AG THROAT QL: NEGATIVE

## 2020-01-10 PROCEDURE — 87070 CULTURE OTHR SPECIMN AEROBIC: CPT | Performed by: PHYSICIAN ASSISTANT

## 2020-01-10 PROCEDURE — 1036F TOBACCO NON-USER: CPT | Performed by: PHYSICIAN ASSISTANT

## 2020-01-10 PROCEDURE — 87147 CULTURE TYPE IMMUNOLOGIC: CPT | Performed by: PHYSICIAN ASSISTANT

## 2020-01-10 PROCEDURE — 99213 OFFICE O/P EST LOW 20 MIN: CPT | Performed by: PHYSICIAN ASSISTANT

## 2020-01-10 PROCEDURE — 87880 STREP A ASSAY W/OPTIC: CPT | Performed by: PHYSICIAN ASSISTANT

## 2020-01-10 RX ORDER — AZITHROMYCIN 250 MG/1
TABLET, FILM COATED ORAL
Qty: 6 TABLET | Refills: 0 | Status: SHIPPED | OUTPATIENT
Start: 2020-01-10 | End: 2020-01-15

## 2020-01-10 NOTE — PROGRESS NOTES
Assessment/Plan:     Diagnoses and all orders for this visit:    Pharyngitis, unspecified etiology  Comments:  Rapid strep a in office is negative  We will sent throat culture to lab  P o  Azithromycin ordered  Orders:  -     azithromycin (ZITHROMAX) 250 mg tablet; Take 2 tablets today then 1 tablet daily x 4 days  -     Throat culture; Future  -     POCT rapid strepA  -     Throat culture    Other orders  -     tretinoin (RETIN-A) 0 025 % cream          Subjective:      Patient ID: Eva Fernandez is a 22 y o  female  Patient presents with upper respiratory symptoms for over week and half  Patient has a history of exercise-induced asthma  Patient states she has not had to use her inhaler  She is coughing and congestion she has bilateral ear pain and pressure along with a sore throat  Patient states she had a fever yesterday  Patient took some DayQuil        The following portions of the patient's history were reviewed and updated as appropriate:   She   Patient Active Problem List    Diagnosis Date Noted    Disorder of right ear lobe 06/21/2019    Amenorrhea 06/21/2019    Allergic rhinitis 01/10/2019    Exercise-induced asthma 01/10/2019     Current Outpatient Medications   Medication Sig Dispense Refill    albuterol (PROVENTIL HFA,VENTOLIN HFA) 90 mcg/act inhaler Inhale 2 puffs every 4 (four) hours as needed for wheezing 18 g 0    BLISOVI 24 FE 1-20 MG-MCG(24) per tablet       busPIRone (BUSPAR) 10 mg tablet Take 10 mg by mouth 3 (three) times a day      Cholecalciferol (VITAMIN D PO) Take by mouth      EPINEPHrine (EPIPEN) 0 3 mg/0 3 mL SOAJ Inject as directed      escitalopram (LEXAPRO) 20 mg tablet Take 20 mg by mouth daily  0    mometasone (NASONEX) 50 mcg/act nasal spray SPRAY 2 SPRAYS INTO EACH NOSTRIL EVERY DAY 1 Act 2    Multiple Vitamins-Minerals (MULTIVITAMIN GUMMIES ADULTS PO) Take by mouth      Omega-3 Fatty Acids (FISH OIL PO) Take by mouth      propranolol (INDERAL) 10 mg tablet       tretinoin (RETIN-A) 0 025 % cream       azithromycin (ZITHROMAX) 250 mg tablet Take 2 tablets today then 1 tablet daily x 4 days 6 tablet 0     No current facility-administered medications for this visit  Current Outpatient Medications on File Prior to Visit   Medication Sig    albuterol (PROVENTIL HFA,VENTOLIN HFA) 90 mcg/act inhaler Inhale 2 puffs every 4 (four) hours as needed for wheezing    BLISOVI 24 FE 1-20 MG-MCG(24) per tablet     busPIRone (BUSPAR) 10 mg tablet Take 10 mg by mouth 3 (three) times a day    Cholecalciferol (VITAMIN D PO) Take by mouth    EPINEPHrine (EPIPEN) 0 3 mg/0 3 mL SOAJ Inject as directed    escitalopram (LEXAPRO) 20 mg tablet Take 20 mg by mouth daily    mometasone (NASONEX) 50 mcg/act nasal spray SPRAY 2 SPRAYS INTO EACH NOSTRIL EVERY DAY    Multiple Vitamins-Minerals (MULTIVITAMIN GUMMIES ADULTS PO) Take by mouth    Omega-3 Fatty Acids (FISH OIL PO) Take by mouth    propranolol (INDERAL) 10 mg tablet     tretinoin (RETIN-A) 0 025 % cream      No current facility-administered medications on file prior to visit  She is allergic to dust mite extract; molds & smuts; nuts; other; and pollen extract       Review of Systems   Constitutional: Positive for fatigue and fever  Negative for activity change, appetite change and chills  HENT: Positive for congestion, ear pain and postnasal drip  Negative for rhinorrhea, sinus pressure, sinus pain and sore throat  Respiratory: Positive for cough  Objective:        Physical Exam   Constitutional: She is oriented to person, place, and time  She appears well-developed and well-nourished  No distress  HENT:   Head: Normocephalic and atraumatic  Right Ear: Tympanic membrane, external ear and ear canal normal    Left Ear: Tympanic membrane, external ear and ear canal normal    Nose: Right sinus exhibits no maxillary sinus tenderness and no frontal sinus tenderness   Left sinus exhibits no maxillary sinus tenderness and no frontal sinus tenderness  Mouth/Throat: Oropharyngeal exudate and posterior oropharyngeal erythema present  Eyes: Pupils are equal, round, and reactive to light  Conjunctivae are normal    Neck: Carotid bruit is not present  Cardiovascular: Normal rate, regular rhythm and normal heart sounds  Exam reveals no gallop and no friction rub  No murmur heard  Pulmonary/Chest: Effort normal and breath sounds normal  No respiratory distress  She has no wheezes  Musculoskeletal: She exhibits no edema  Lymphadenopathy:     She has cervical adenopathy  Neurological: She is alert and oriented to person, place, and time  Skin: Skin is warm and dry  No rash noted  She is not diaphoretic  No erythema  Psychiatric: She has a normal mood and affect  Her behavior is normal  Judgment and thought content normal    Nursing note and vitals reviewed

## 2020-01-11 LAB — BACTERIA THROAT CULT: ABNORMAL

## 2020-01-17 ENCOUNTER — TELEPHONE (OUTPATIENT)
Dept: FAMILY MEDICINE CLINIC | Facility: CLINIC | Age: 20
End: 2020-01-17

## 2020-01-17 NOTE — TELEPHONE ENCOUNTER
Spoke with patient  She finished azithromycin strep throat  She still very tired sore throat fevers have resolved  Patient advised to go to [facility] or urgent care  She needs to be checked for mononucleosis

## 2020-01-17 NOTE — TELEPHONE ENCOUNTER
Patient called in stating that she still is not feeling better  She finished her abx and wants to know what she should do   Please advise

## 2020-07-29 ENCOUNTER — OFFICE VISIT (OUTPATIENT)
Dept: FAMILY MEDICINE CLINIC | Facility: CLINIC | Age: 20
End: 2020-07-29
Payer: COMMERCIAL

## 2020-07-29 VITALS
DIASTOLIC BLOOD PRESSURE: 70 MMHG | BODY MASS INDEX: 36.91 KG/M2 | HEIGHT: 62 IN | TEMPERATURE: 97.5 F | RESPIRATION RATE: 16 BRPM | SYSTOLIC BLOOD PRESSURE: 138 MMHG | HEART RATE: 108 BPM | WEIGHT: 200.6 LBS | OXYGEN SATURATION: 97 %

## 2020-07-29 DIAGNOSIS — Z11.1 SCREENING FOR TUBERCULOSIS: ICD-10-CM

## 2020-07-29 DIAGNOSIS — Z00.00 ANNUAL PHYSICAL EXAM: Primary | ICD-10-CM

## 2020-07-29 DIAGNOSIS — Z00.00 ANNUAL PHYSICAL EXAM: ICD-10-CM

## 2020-07-29 PROCEDURE — 99395 PREV VISIT EST AGE 18-39: CPT | Performed by: INTERNAL MEDICINE

## 2020-07-29 PROCEDURE — 3008F BODY MASS INDEX DOCD: CPT | Performed by: INTERNAL MEDICINE

## 2020-07-29 PROCEDURE — 86580 TB INTRADERMAL TEST: CPT | Performed by: INTERNAL MEDICINE

## 2020-07-29 NOTE — PROGRESS NOTES
BMI Counseling: Body mass index is 36 69 kg/m²  The BMI is above normal  Nutrition recommendations include decreasing portion sizes and limiting drinks that contain sugar  Exercise recommendations include exercising 3-5 times per week  No pharmacotherapy was ordered  Patient referred to PCP due to patient being overweight  Assessment/Plan:    No problem-specific Assessment & Plan notes found for this encounter  There are no diagnoses linked to this encounter  Subjective:      Patient ID: Nia Batres is a 22 y o  female  HPI    The following portions of the patient's history were reviewed and updated as appropriate: She  has a past medical history of Abnormal blood chemistry (07/04/2011), Allergic rhinitis, Dysuria, Exercise-induced asthma, Fatigue, Hemangioma of face (2006), Nasal fracture (2012), and Nevus of head (2006)  She   Patient Active Problem List    Diagnosis Date Noted    Disorder of right ear lobe 06/21/2019    Amenorrhea 06/21/2019    Allergic rhinitis 01/10/2019    Exercise-induced asthma 01/10/2019     She  has a past surgical history that includes [place] tooth extraction  Her family history includes Anemia in her other; Breast cancer in her mother; Cancer in her other; Diabetes in her father and maternal grandmother; Heart disease in her father; Hyperlipidemia in her father; Hypertension in her father, maternal grandfather, maternal grandmother, and mother; Mental illness in her cousin; Migraines in her cousin and father; Other in her cousin  She  reports that she has never smoked  She has never used smokeless tobacco  She reports that she drinks alcohol  She reports that she does not use drugs    Current Outpatient Medications   Medication Sig Dispense Refill    albuterol (PROVENTIL HFA,VENTOLIN HFA) 90 mcg/act inhaler Inhale 2 puffs every 4 (four) hours as needed for wheezing 18 g 0    BLISOVI 24 FE 1-20 MG-MCG(24) per tablet       busPIRone (BUSPAR) 10 mg tablet Take 10 mg by mouth 3 (three) times a day       Cholecalciferol (VITAMIN D PO) Take by mouth      EPINEPHrine (EPIPEN) 0 3 mg/0 3 mL SOAJ Inject as directed      escitalopram (LEXAPRO) 20 mg tablet Take 20 mg by mouth daily  0    mometasone (NASONEX) 50 mcg/act nasal spray SPRAY 2 SPRAYS INTO EACH NOSTRIL EVERY DAY 1 Act 2    Multiple Vitamins-Minerals (MULTIVITAMIN GUMMIES ADULTS PO) Take by mouth      Omega-3 Fatty Acids (FISH OIL PO) Take by mouth      propranolol (INDERAL) 10 mg tablet       tretinoin (RETIN-A) 0 025 % cream        No current facility-administered medications for this visit  Current Outpatient Medications on File Prior to Visit   Medication Sig    albuterol (PROVENTIL HFA,VENTOLIN HFA) 90 mcg/act inhaler Inhale 2 puffs every 4 (four) hours as needed for wheezing    BLISOVI 24 FE 1-20 MG-MCG(24) per tablet     busPIRone (BUSPAR) 10 mg tablet Take 10 mg by mouth 3 (three) times a day     Cholecalciferol (VITAMIN D PO) Take by mouth    EPINEPHrine (EPIPEN) 0 3 mg/0 3 mL SOAJ Inject as directed    escitalopram (LEXAPRO) 20 mg tablet Take 20 mg by mouth daily    mometasone (NASONEX) 50 mcg/act nasal spray SPRAY 2 SPRAYS INTO EACH NOSTRIL EVERY DAY    Multiple Vitamins-Minerals (MULTIVITAMIN GUMMIES ADULTS PO) Take by mouth    Omega-3 Fatty Acids (FISH OIL PO) Take by mouth    propranolol (INDERAL) 10 mg tablet     tretinoin (RETIN-A) 0 025 % cream      No current facility-administered medications on file prior to visit  She is allergic to dust mite extract; molds & smuts; nuts; other; and pollen extract       Review of Systems      Objective:      /70   Pulse (!) 108   Temp 97 5 °F (36 4 °C)   Resp 16   Ht 5' 2" (1 575 m)   Wt 91 kg (200 lb 9 6 oz)   LMP 06/22/2020   SpO2 97%   BMI 36 69 kg/m²          Physical Exam

## 2020-07-29 NOTE — PROGRESS NOTES
[address] [place] [address]    NAME: Elizabeth Suh  AGE: 22 y o  SEX: female  : 2000     DATE: 2020     Assessment and Plan:     Problem List Items Addressed This Visit     None          Immunizations and preventive care screenings were discussed with patient today  Appropriate education was printed on patient's after visit summary  Counseling:  Dental Health: discussed importance of regular tooth brushing, flossing, and dental visits  · Exercise: the importance of regular exercise/physical activity was discussed  Recommend exercise 3-5 times per week for at least 30 minutes  BMI Counseling: Body mass index is 36 69 kg/m²  The BMI is above normal  Nutrition recommendations include decreasing portion sizes and limiting drinks that contain sugar  Exercise recommendations include exercising 3-5 times per week  No pharmacotherapy was ordered  Patient referred to PCP due to patient being overweight  No follow-ups on file  Chief Complaint:     Chief Complaint   Patient presents with    Physical Exam      History of Present Illness:     Adult Annual Physical   Patient here for a comprehensive physical exam  The patient reports no problems  Diet and Physical Activity  · Diet/Nutrition: well balanced diet  · Exercise: walking and 3-4 times a week on average  Depression Screening  PHQ-9 Depression Screening    PHQ-9:    Frequency of the following problems over the past two weeks:       Little interest or pleasure in doing things:  0 - not at all  Feeling down, depressed, or hopeless:  0 - not at all  PHQ-2 Score:  0       General Health  · Sleep: gets 7-8 hours of sleep on average  · Hearing: normal - bilateral   · Vision: no vision problems  · Dental: regular dental visits  /GYN Health  · Last menstrual period: 20  · Contraceptive method: oral contraceptives    · History of STDs?: no      Review of Systems:     Review of Systems   Constitutional: Negative for chills and fever  HENT: Negative  Respiratory: Negative  Negative for cough and shortness of breath  Cardiovascular: Negative         Past Medical History:     Past Medical History:   Diagnosis Date    Abnormal blood chemistry 07/04/2011    lavinia [place]     Allergic rhinitis     Dysuria     urinary    Exercise-induced asthma     Fatigue     last assessed 5/5/15; resolved 1/2/16    Hemangioma of face 2006    chin     Nasal fracture 2012    Nevus of head 2006    left cheek      Past Surgical History:     Past Surgical History:   Procedure Laterality Date    WISDOM TOOTH EXTRACTION        Social History:        Social History     Socioeconomic History    Marital status: Single     Spouse name: None    Number of children: None    Years of education: None    Highest education level: None   Occupational History    None   Social Needs    Financial resource strain: None    Food insecurity:     Worry: None     Inability: None    Transportation needs:     Medical: None     Non-medical: None   Tobacco Use    Smoking status: Never Smoker    Smokeless tobacco: Never Used   Substance and Sexual Activity    Alcohol use: Yes     Comment: rare    Drug use: No    Sexual activity: Never   Lifestyle    Physical activity:     Days per week: None     Minutes per session: None    Stress: None   Relationships    Social connections:     Talks on phone: None     Gets together: None     Attends Congregation service: None     Active member of club or organization: None     Attends meetings of clubs or organizations: None     Relationship status: None    Intimate partner violence:     Fear of current or ex partner: None     Emotionally abused: None     Physically abused: None     Forced sexual activity: None   Other Topics Concern    None   Social History Narrative    Activities - cheerleading       Family History:     Family History Problem Relation Age of Onset    Hypertension Mother     Breast cancer Mother     Heart disease Father         cardiac disorder     Diabetes Father     Hypertension Father     Migraines Father     Hyperlipidemia Father     Diabetes Maternal Grandmother     Hypertension Maternal Grandmother     Hypertension Maternal Grandfather     Anemia Other     Cancer Other     Other Cousin         aids    Mental illness Cousin         retardation    Migraines Cousin       Current Medications:     Current Outpatient Medications   Medication Sig Dispense Refill    albuterol (PROVENTIL HFA,VENTOLIN HFA) 90 mcg/act inhaler Inhale 2 puffs every 4 (four) hours as needed for wheezing 18 g 0    BLISOVI 24 FE 1-20 MG-MCG(24) per tablet       busPIRone (BUSPAR) 10 mg tablet Take 10 mg by mouth 3 (three) times a day       Cholecalciferol (VITAMIN D PO) Take by mouth      EPINEPHrine (EPIPEN) 0 3 mg/0 3 mL SOAJ Inject as directed      escitalopram (LEXAPRO) 20 mg tablet Take 20 mg by mouth daily  0    mometasone (NASONEX) 50 mcg/act nasal spray SPRAY 2 SPRAYS INTO EACH NOSTRIL EVERY DAY 1 Act 2    Multiple Vitamins-Minerals (MULTIVITAMIN GUMMIES ADULTS PO) Take by mouth      Omega-3 Fatty Acids (FISH OIL PO) Take by mouth      propranolol (INDERAL) 10 mg tablet       tretinoin (RETIN-A) 0 025 % cream        No current facility-administered medications for this visit  Allergies: Allergies   Allergen Reactions    Dust Mite Extract     Molds & Smuts     Nuts     Other     Pollen Extract       Physical Exam:     /70   Pulse (!) 108   Temp 97 5 °F (36 4 °C)   Resp 16   Ht 5' 2" (1 575 m)   Wt 91 kg (200 lb 9 6 oz)   LMP 06/22/2020   SpO2 97%   BMI 36 69 kg/m²     Physical Exam   Constitutional: She appears well-developed and well-nourished  No distress  HENT:   Head: Normocephalic and atraumatic     Right Ear: External ear normal    Left Ear: External ear normal    Nose: Nose normal  Mouth/Throat: Oropharynx is clear and moist  No oropharyngeal exudate  Neck: Normal range of motion  Neck supple  No thyromegaly present  Cardiovascular: Normal rate, regular rhythm, normal heart sounds and intact distal pulses  Exam reveals no gallop and no friction rub  No murmur heard  Pulmonary/Chest: Effort normal and breath sounds normal  No respiratory distress  She has no wheezes  She has no rales  Lymphadenopathy:     She has no cervical adenopathy  Skin: She is not diaphoretic         Orion Darting, DO   [place]

## 2020-08-06 LAB
M TB IFN-G CD4+ BCKGRND COR BLD-ACNC: 0 IU/ML
M TB IFN-G CD4+ BCKGRND COR BLD-ACNC: 0 IU/ML
M TB IFN-G CD4+ T-CELLS BLD-ACNC: 0.02 IU/ML
M TB TUBERC IFN-G BLD QL: NEGATIVE
M TB TUBERC IGNF/MITOGEN IGNF CONTROL: 8.77 IU/ML

## 2020-09-22 ENCOUNTER — TELEPHONE (OUTPATIENT)
Dept: FAMILY MEDICINE CLINIC | Facility: CLINIC | Age: 20
End: 2020-09-22

## 2020-09-22 DIAGNOSIS — Z11.59 NEED FOR HEPATITIS B SCREENING TEST: Primary | ICD-10-CM

## 2020-09-22 NOTE — TELEPHONE ENCOUNTER
Pt is requesting orders for Heb B titer for [facility]  MR:  Please call pt when it is in system so she can  at

## 2020-10-12 LAB
HBV SURFACE AB SER QL IA: NORMAL
HBV SURFACE AG SERPL QL IA: NORMAL

## 2020-10-15 ENCOUNTER — TELEPHONE (OUTPATIENT)
Dept: FAMILY MEDICINE CLINIC | Facility: CLINIC | Age: 20
End: 2020-10-15

## 2020-11-11 ENCOUNTER — OFFICE VISIT (OUTPATIENT)
Dept: FAMILY MEDICINE CLINIC | Facility: CLINIC | Age: 20
End: 2020-11-11
Payer: COMMERCIAL

## 2020-11-11 VITALS
HEIGHT: 62 IN | SYSTOLIC BLOOD PRESSURE: 134 MMHG | DIASTOLIC BLOOD PRESSURE: 88 MMHG | HEART RATE: 106 BPM | WEIGHT: 207.8 LBS | TEMPERATURE: 97.8 F | BODY MASS INDEX: 38.24 KG/M2 | OXYGEN SATURATION: 98 %

## 2020-11-11 DIAGNOSIS — R00.2 PALPITATIONS: ICD-10-CM

## 2020-11-11 DIAGNOSIS — R00.0 TACHYCARDIA: Primary | ICD-10-CM

## 2020-11-11 DIAGNOSIS — Z23 ENCOUNTER FOR IMMUNIZATION: ICD-10-CM

## 2020-11-11 DIAGNOSIS — E78.2 ELEVATED CHOLESTEROL WITH ELEVATED TRIGLYCERIDES: ICD-10-CM

## 2020-11-11 PROCEDURE — 99214 OFFICE O/P EST MOD 30 MIN: CPT | Performed by: INTERNAL MEDICINE

## 2020-11-11 PROCEDURE — 90472 IMMUNIZATION ADMIN EACH ADD: CPT

## 2020-11-11 PROCEDURE — 90746 HEPB VACCINE 3 DOSE ADULT IM: CPT

## 2020-11-11 PROCEDURE — 93000 ELECTROCARDIOGRAM COMPLETE: CPT | Performed by: INTERNAL MEDICINE

## 2020-11-11 PROCEDURE — 90651 9VHPV VACCINE 2/3 DOSE IM: CPT

## 2020-11-11 PROCEDURE — 90471 IMMUNIZATION ADMIN: CPT

## 2020-11-11 RX ORDER — METOPROLOL SUCCINATE 25 MG/1
25 TABLET, EXTENDED RELEASE ORAL DAILY
Qty: 30 TABLET | Refills: 1 | Status: SHIPPED | OUTPATIENT
Start: 2020-11-11

## 2020-11-11 RX ORDER — OMEGA-3-ACID ETHYL ESTERS 1 G/1
2 CAPSULE, LIQUID FILLED ORAL 2 TIMES DAILY
Qty: 120 CAPSULE | Refills: 1 | Status: SHIPPED | OUTPATIENT
Start: 2020-11-11 | End: 2021-01-01

## 2020-12-31 DIAGNOSIS — E78.2 ELEVATED CHOLESTEROL WITH ELEVATED TRIGLYCERIDES: ICD-10-CM

## 2021-01-01 RX ORDER — OMEGA-3-ACID ETHYL ESTERS 1 G/1
2 CAPSULE, LIQUID FILLED ORAL 2 TIMES DAILY
Qty: 120 CAPSULE | Refills: 1 | Status: SHIPPED | OUTPATIENT
Start: 2021-01-01 | End: 2021-01-26

## 2021-01-26 DIAGNOSIS — E78.2 ELEVATED CHOLESTEROL WITH ELEVATED TRIGLYCERIDES: ICD-10-CM

## 2021-01-26 RX ORDER — OMEGA-3-ACID ETHYL ESTERS 1 G/1
2 CAPSULE, LIQUID FILLED ORAL 2 TIMES DAILY
Qty: 360 CAPSULE | Refills: 1 | Status: SHIPPED | OUTPATIENT
Start: 2021-01-26 | End: 2021-08-06

## 2021-06-07 ENCOUNTER — TELEPHONE (OUTPATIENT)
Dept: PSYCHIATRY | Facility: CLINIC | Age: 21
End: 2021-06-07

## 2021-06-07 NOTE — TELEPHONE ENCOUNTER
Mom called and would like to set up an apt for Diana Jaramillo I told her to contact her PCP to get a referral and if she would like to do the scheduling for her to have Diana Jaramillo give us a call to give her verbal consent so she can set up the apt for her

## 2021-06-23 ENCOUNTER — TELEPHONE (OUTPATIENT)
Dept: FAMILY MEDICINE CLINIC | Facility: CLINIC | Age: 21
End: 2021-06-23

## 2021-06-23 DIAGNOSIS — Z11.1 SCREENING-PULMONARY TB: Primary | ICD-10-CM

## 2021-07-13 ENCOUNTER — OFFICE VISIT (OUTPATIENT)
Dept: PODIATRY | Facility: CLINIC | Age: 21
End: 2021-07-13
Payer: COMMERCIAL

## 2021-07-13 VITALS
SYSTOLIC BLOOD PRESSURE: 141 MMHG | BODY MASS INDEX: 34.52 KG/M2 | DIASTOLIC BLOOD PRESSURE: 87 MMHG | HEART RATE: 106 BPM | HEIGHT: 62 IN | WEIGHT: 187.6 LBS

## 2021-07-13 DIAGNOSIS — M79.675 PAIN IN TOE OF LEFT FOOT: ICD-10-CM

## 2021-07-13 DIAGNOSIS — L60.0 INGROWN TOENAIL: Primary | ICD-10-CM

## 2021-07-13 PROCEDURE — 11730 AVULSION NAIL PLATE SIMPLE 1: CPT | Performed by: PODIATRIST

## 2021-07-13 PROCEDURE — 99202 OFFICE O/P NEW SF 15 MIN: CPT | Performed by: PODIATRIST

## 2021-07-13 PROCEDURE — 3008F BODY MASS INDEX DOCD: CPT | Performed by: PODIATRIST

## 2021-07-13 PROCEDURE — 1036F TOBACCO NON-USER: CPT | Performed by: PODIATRIST

## 2021-07-13 RX ORDER — BUPROPION HYDROCHLORIDE 150 MG/1
150 TABLET ORAL DAILY
COMMUNITY
Start: 2021-07-06

## 2021-07-13 RX ORDER — LIDOCAINE HYDROCHLORIDE AND EPINEPHRINE 10; 10 MG/ML; UG/ML
1 INJECTION, SOLUTION INFILTRATION; PERINEURAL ONCE
Status: COMPLETED | OUTPATIENT
Start: 2021-07-13 | End: 2021-07-13

## 2021-07-13 RX ORDER — FENOFIBRATE 145 MG/1
145 TABLET, COATED ORAL DAILY
COMMUNITY
Start: 2021-06-11

## 2021-07-13 RX ORDER — LIDOCAINE HYDROCHLORIDE 10 MG/ML
1 INJECTION, SOLUTION EPIDURAL; INFILTRATION; INTRACAUDAL; PERINEURAL ONCE
Status: COMPLETED | OUTPATIENT
Start: 2021-07-13 | End: 2021-07-13

## 2021-07-13 RX ORDER — TOPIRAMATE 25 MG/1
25 TABLET ORAL 2 TIMES DAILY
COMMUNITY
Start: 2021-05-05 | End: 2021-12-23

## 2021-07-13 RX ADMIN — LIDOCAINE HYDROCHLORIDE 1 ML: 10 INJECTION, SOLUTION EPIDURAL; INFILTRATION; INTRACAUDAL; PERINEURAL at 17:27

## 2021-07-13 RX ADMIN — LIDOCAINE HYDROCHLORIDE AND EPINEPHRINE 1 ML: 10; 10 INJECTION, SOLUTION INFILTRATION; PERINEURAL at 17:27

## 2021-07-13 NOTE — PROGRESS NOTES
Assessment/Plan:      Explained the patient that she is dealing with ingrown nails along the medial and lateral nail border of the left great toe  Discussed treatment options recommending partial avulsion  The patient is aware that the ingrown nail will return in approximately 4-6 months with this procedure  A partial matrixectomy may be needed in the future  Anesthesia via 3 cc of a one-to-one mixture of 1 percent xylocaine with epinephrine and 1 percent xylocaine plain  A Betadine prep was performed  The   Medial and lateral nail border of the left great toe were avulsed to the eponychium  Bacitracin dressing was applied  The patient is to soak in warm water b i d  followed by a Neosporin dressing tomorrow  The patient is rescheduled in 1 week  No problem-specific Assessment & Plan notes found for this encounter  Diagnoses and all orders for this visit:    Ingrown toenail  -     lidocaine (PF) (XYLOCAINE-MPF) 1 % injection 1 mL  -     lidocaine-epinephrine (XYLOCAINE/EPINEPHRINE) 1 %-1:100,000 injection 1 mL    Pain in toe of left foot    Other orders  -     buPROPion (WELLBUTRIN XL) 150 mg 24 hr tablet; Take 150 mg by mouth daily  -     fenofibrate (TRICOR) 145 mg tablet; Take 145 mg by mouth daily  -     topiramate (TOPAMAX) 25 mg tablet; Take 25 mg by mouth 2 (two) times a day (Patient not taking: Reported on 7/13/2021)          Subjective:      Patient ID: Baldomero Mi is a 23 y o  female  HPI    Patient presents with painful ingrown toenails affecting the medial and lateral nail border of the left great toe  Patient recalls similar problems when she was much younger but no recent issues  The following portions of the patient's history were reviewed and updated as appropriate: allergies, current medications, past family history, past medical history, past social history, past surgical history and problem list     Review of Systems   Respiratory: Negative  Cardiovascular: Negative  Gastrointestinal: Negative  Musculoskeletal: Negative  Objective:      /87   Pulse (!) 106   Ht 5' 2" (1 575 m)   Wt 85 1 kg (187 lb 9 6 oz)   BMI 34 31 kg/m²          Physical Exam  Constitutional:       Appearance: Normal appearance  Cardiovascular:      Pulses: Normal pulses  Musculoskeletal:         General: Normal range of motion  Skin:     Comments: Pain with palpation medial lateral nail border left great toe  No evidence of infection  Neurological:      General: No focal deficit present  Mental Status: She is oriented to person, place, and time  Nail removal    Date/Time: 7/13/2021 6:23 PM  Performed by: Stevie French DPM  Authorized by: Stevie French DPM     Patient location:  Clinic[facility] Protocol:  Consent: Verbal consent obtained  Risks and benefits: risks, benefits and alternatives were discussed  Consent given by: patient  Patient understanding: patient states understanding of the procedure being performed      Location:     Foot:  L big toe  Pre-procedure details:     Skin preparation:  Betadine  Anesthesia (see MAR for exact dosages): Anesthesia method:  Nerve block    Block needle gauge:  25 G    Block anesthetic:  Lidocaine 1% WITH epi and lidocaine 1% w/o epi    Block injection procedure:  Anatomic landmarks identified    Block outcome:  Anesthesia achieved  Nail Removal:     Nail removed:  Partial    Nail side:  Lateral  Ingrown nail:     Wedge excision of skin: no      Nail matrix removed or ablated:  None  Post-procedure details:     Dressing:  4x4 sterile gauze, antibiotic ointment and gauze roll    Patient tolerance of procedure: Tolerated well, no immediate complications  Comments: Both the medial and lateral nail border of the left great toe were avulsed

## 2021-07-20 ENCOUNTER — OFFICE VISIT (OUTPATIENT)
Dept: PODIATRY | Facility: CLINIC | Age: 21
End: 2021-07-20
Payer: COMMERCIAL

## 2021-07-20 VITALS
HEIGHT: 62 IN | SYSTOLIC BLOOD PRESSURE: 132 MMHG | BODY MASS INDEX: 34.34 KG/M2 | WEIGHT: 186.6 LBS | DIASTOLIC BLOOD PRESSURE: 89 MMHG | HEART RATE: 109 BPM

## 2021-07-20 DIAGNOSIS — L60.0 INGROWN TOENAIL: Primary | ICD-10-CM

## 2021-07-20 PROCEDURE — 3008F BODY MASS INDEX DOCD: CPT | Performed by: PODIATRIST

## 2021-07-20 PROCEDURE — 1036F TOBACCO NON-USER: CPT | Performed by: PODIATRIST

## 2021-07-20 PROCEDURE — 99212 OFFICE O/P EST SF 10 MIN: CPT | Performed by: PODIATRIST

## 2021-07-20 RX ORDER — LISINOPRIL 5 MG/1
5 TABLET ORAL DAILY
COMMUNITY
Start: 2021-07-14

## 2021-07-20 NOTE — PROGRESS NOTES
Patient presents for assessment of left great toe  Patient had medial and lateral nail borders avulsed via partial avulsion 1 week ago  Medial border has not quite healed  No evidence of infection  Patient may discontinue soaks and Neosporin  Patient should contact me should she have pain when ingrown nails possibly recur

## 2021-08-06 DIAGNOSIS — E78.2 ELEVATED CHOLESTEROL WITH ELEVATED TRIGLYCERIDES: ICD-10-CM

## 2021-08-06 RX ORDER — OMEGA-3-ACID ETHYL ESTERS 1 G/1
2 CAPSULE, LIQUID FILLED ORAL 2 TIMES DAILY
Qty: 60 CAPSULE | Refills: 0 | Status: SHIPPED | OUTPATIENT
Start: 2021-08-06 | End: 2021-08-20

## 2021-08-20 DIAGNOSIS — E78.2 ELEVATED CHOLESTEROL WITH ELEVATED TRIGLYCERIDES: ICD-10-CM

## 2021-08-20 RX ORDER — OMEGA-3-ACID ETHYL ESTERS 1 G/1
2 CAPSULE, LIQUID FILLED ORAL 2 TIMES DAILY
Qty: 60 CAPSULE | Refills: 0 | Status: SHIPPED | OUTPATIENT
Start: 2021-08-20

## 2021-08-28 ENCOUNTER — OFFICE VISIT (OUTPATIENT)
Dept: URGENT CARE | Age: 21
End: 2021-08-28
Payer: COMMERCIAL

## 2021-08-28 VITALS — HEART RATE: 106 BPM | OXYGEN SATURATION: 98 % | RESPIRATION RATE: 20 BRPM | TEMPERATURE: 97.1 F

## 2021-08-28 DIAGNOSIS — B34.9 ACUTE VIRAL SYNDROME: ICD-10-CM

## 2021-08-28 DIAGNOSIS — Z11.52 ENCOUNTER FOR SCREENING FOR COVID-19: Primary | ICD-10-CM

## 2021-08-28 PROCEDURE — G0382 LEV 3 HOSP TYPE B ED VISIT: HCPCS | Performed by: NURSE PRACTITIONER

## 2021-08-28 PROCEDURE — 87635 SARS-COV-2 COVID-19 AMP PRB: CPT | Performed by: NURSE PRACTITIONER

## 2021-08-28 NOTE — PATIENT INSTRUCTIONS
Acute Nausea and Vomiting   WHAT YOU NEED TO KNOW:   Acute nausea and vomiting start suddenly, worsen quickly, and last a short time  DISCHARGE INSTRUCTIONS:   Return to the emergency department if:   · You see blood in your vomit or your bowel movements  · You have sudden, severe pain in your chest and upper abdomen after hard vomiting or retching  · You have swelling in your neck and chest      · You are dizzy, cold, and thirsty and your eyes and mouth are dry  · You are urinating very little or not at all  · You have muscle weakness, leg cramps, and trouble breathing  · Your heart is beating much faster than normal      · You continue to vomit for more than 48 hours  Contact your healthcare provider if:   · You have frequent dry heaves (vomiting but nothing comes out)  · Your nausea and vomiting does not get better or go away after you use medicine  · You have questions or concerns about your condition or treatment  Medicines: You may need any of the following:  · Medicines  may be given to calm your stomach and stop your vomiting  You may also need medicines to help you feel more relaxed or to stop nausea and vomiting caused by motion sickness  · Gastrointestinal stimulants  are used to help empty your stomach and bowels  This may help decrease nausea and vomiting  · Take your medicine as directed  Contact your healthcare provider if you think your medicine is not helping or if you have side effects  Tell him or her if you are allergic to any medicine  Keep a list of the medicines, vitamins, and herbs you take  Include the amounts, and when and why you take them  Bring the list or the pill bottles to follow-up visits  Carry your medicine list with you in case of an emergency  Prevent or manage acute nausea and vomiting:   · Do not drink alcohol  Alcohol may upset or irritate your stomach  Too much alcohol can also cause acute nausea and vomiting  · Control stress  Headaches due to stress may cause nausea and vomiting  Find ways to relax and manage your stress  Get more rest and sleep  · Drink more liquids as directed  Vomiting can lead to dehydration  It is important to drink more liquids to help replace lost body fluids  Ask your healthcare provider how much liquid to drink each day and which liquids are best for you  Your provider may recommend that you drink an oral rehydration solution (ORS)  ORS contains water, salts, and sugar that are needed to replace the lost body fluids  Ask what kind of ORS to use, how much to drink, and where to get it  · Eat smaller meals, more often  Eat small amounts of food every 2 to 3 hours, even if you are not hungry  Food in your stomach may decrease your nausea  · Talk to your healthcare provider before you take over-the-counter (OTC) medicines  These medicines can cause serious problems if you use certain other medicines, or you have a medical condition  You may have problems if you use too much or use them for longer than the label says  Follow directions on the label carefully  Follow up with your healthcare provider as directed:  Write down your questions so you remember to ask them during your follow-up visits  © Copyright [employer] 2021 Information is for End User's use only and may not be sold, redistributed or otherwise used for commercial purposes  All illustrations and images included in CareNotes® are the copyrighted property of A D A M , Inc  or Elisha Lei  The above information is an  only  It is not intended as medical advice for individual conditions or treatments  Talk to your doctor, nurse or pharmacist before following any medical regimen to see if it is safe and effective for you

## 2021-08-28 NOTE — PROGRESS NOTES
[facility]        NAME: Priscilla Rizvi is a 23 y o  female  : 2000    MRN: [id number]  DATE: 2021  TIME: 6:28 PM    Assessment and Plan   Encounter for screening for COVID-19 [Z11 52]  1  Encounter for screening for COVID-19  Novel Coronavirus (Covid-19),PCR [facility] - Office Collection   2  Acute viral syndrome           Patient Instructions     Covid tested; results in 1-2 days  Stay quarantined   Follow up with PCP in 3-5 days  Proceed to  ER if symptoms worsen  Chief Complaint     Chief Complaint   Patient presents with    Headache     pt states started with symptoms 3 days ago, exposed to family member with covid, pt vaccinated    Generalized Body Aches    Fatigue         History of Present Illness       HPI   Reports father is positive for covid  She has been caring for him  Now having some symptoms  Wants to r/o covid  Has been vaccinated for covid  Review of Systems   Review of Systems   Constitutional: Positive for fatigue  HENT: Negative for sore throat  Respiratory: Negative for cough, chest tightness, shortness of breath and wheezing  Gastrointestinal: Positive for nausea  Negative for vomiting  Musculoskeletal: Positive for myalgias  Neurological: Positive for headaches           Current Medications       Current Outpatient Medications:     albuterol (PROVENTIL HFA,VENTOLIN HFA) 90 mcg/act inhaler, Inhale 2 puffs every 4 (four) hours as needed for wheezing, Disp: 18 g, Rfl: 0    BLISOVI 24 FE 1-20 MG-MCG(24) per tablet, , Disp: , Rfl:     buPROPion (WELLBUTRIN XL) 150 mg 24 hr tablet, Take 150 mg by mouth daily, Disp: , Rfl:     Cholecalciferol (VITAMIN D PO), Take by mouth, Disp: , Rfl:     EPINEPHrine (EPIPEN) 0 3 mg/0 3 mL SOAJ, Inject as directed, Disp: , Rfl:     escitalopram (LEXAPRO) 20 mg tablet, Take 20 mg by mouth daily , Disp: , Rfl: 0    fenofibrate (TRICOR) 145 mg tablet, Take 145 mg by mouth daily, Disp: , Rfl:     lisinopril (ZESTRIL) 5 mg tablet, Take 5 mg by mouth daily, Disp: , Rfl:     metoprolol succinate (TOPROL-XL) 25 mg 24 hr tablet, Take 1 tablet (25 mg total) by mouth daily, Disp: 30 tablet, Rfl: 1    mometasone (NASONEX) 50 mcg/act nasal spray, SPRAY 2 SPRAYS INTO EACH NOSTRIL EVERY DAY, Disp: 1 Act, Rfl: 2    Multiple Vitamins-Minerals (MULTIVITAMIN GUMMIES ADULTS PO), Take by mouth, Disp: , Rfl:     omega-3-acid ethyl esters (LOVAZA) 1 g capsule, TAKE 2 CAPSULES (2 G TOTAL) BY MOUTH 2 (TWO) TIMES A DAY, Disp: 60 capsule, Rfl: 0    topiramate (TOPAMAX) 25 mg tablet, Take 25 mg by mouth 2 (two) times a day , Disp: , Rfl:     tretinoin (RETIN-A) 0 025 % cream, , Disp: , Rfl:     Current Allergies     Allergies as of 08/28/2021 - Reviewed 08/28/2021   Allergen Reaction Noted    Dust mite extract  08/12/2014    Molds & smuts  08/12/2014    Nuts - food allergy  08/12/2014    Other  08/12/2014    Pollen extract  08/12/2014            The following portions of the patient's history were reviewed and updated as appropriate: allergies, current medications, past family history, past medical history, past social history, past surgical history and problem list      Past Medical History:   Diagnosis Date    Abnormal blood chemistry 07/04/2011    lavinia Vanegas     Allergic rhinitis     Dysuria     urinary    Exercise-induced asthma     Fatigue     last assessed 5/5/15; resolved 1/2/16    Hemangioma of face 2006    chin     History of psychiatric treatment     Hypertension     Nasal fracture 2012    Nevus of head 2006    left cheek       Past Surgical History:   Procedure Laterality Date    WISDOM TOOTH EXTRACTION         Family History   Problem Relation Age of Onset    Hypertension Mother     Breast cancer Mother     Heart disease Father         cardiac disorder     Diabetes Father     Hypertension Father     Migraines Father     Hyperlipidemia Father     Diabetes Maternal Grandmother     Hypertension Maternal Grandmother     Hypertension Maternal Grandfather     Anemia Other     Cancer Other     Other Cousin         aids    Mental illness Cousin         retardation    Migraines Cousin          Medications have been verified  Objective   Pulse (!) 106   Temp (!) 97 1 °F (36 2 °C)   Resp 20   LMP  (Approximate)   SpO2 98%   No LMP recorded (approximate)  (Menstrual status: Birth Control)  Physical Exam     Physical Exam  Constitutional:       Appearance: She is not ill-appearing or diaphoretic  HENT:      Right Ear: Tympanic membrane and ear canal normal       Left Ear: Tympanic membrane and ear canal normal       Nose: No rhinorrhea  Cardiovascular:      Rate and Rhythm: Regular rhythm  Heart sounds: Normal heart sounds  Pulmonary:      Effort: Pulmonary effort is normal       Breath sounds: Normal breath sounds  Neurological:      Mental Status: She is alert

## 2021-08-30 LAB — SARS-COV-2 RNA RESP QL NAA+PROBE: NEGATIVE

## 2021-12-23 ENCOUNTER — OFFICE VISIT (OUTPATIENT)
Dept: PODIATRY | Facility: CLINIC | Age: 21
End: 2021-12-23
Payer: COMMERCIAL

## 2021-12-23 VITALS
DIASTOLIC BLOOD PRESSURE: 90 MMHG | HEIGHT: 62 IN | BODY MASS INDEX: 36.62 KG/M2 | WEIGHT: 199 LBS | HEART RATE: 97 BPM | SYSTOLIC BLOOD PRESSURE: 133 MMHG

## 2021-12-23 DIAGNOSIS — M79.675 PAIN IN TOE OF LEFT FOOT: ICD-10-CM

## 2021-12-23 DIAGNOSIS — L60.0 INGROWN TOENAIL: Primary | ICD-10-CM

## 2021-12-23 PROCEDURE — 11750 EXCISION NAIL&NAIL MATRIX: CPT | Performed by: PODIATRIST

## 2021-12-23 RX ORDER — LIDOCAINE HYDROCHLORIDE 10 MG/ML
1 INJECTION, SOLUTION EPIDURAL; INFILTRATION; INTRACAUDAL; PERINEURAL ONCE
Status: COMPLETED | OUTPATIENT
Start: 2021-12-23 | End: 2021-12-23

## 2021-12-23 RX ORDER — LIDOCAINE HYDROCHLORIDE AND EPINEPHRINE 10; 10 MG/ML; UG/ML
1 INJECTION, SOLUTION INFILTRATION; PERINEURAL ONCE
Status: COMPLETED | OUTPATIENT
Start: 2021-12-23 | End: 2021-12-23

## 2021-12-23 RX ORDER — GABAPENTIN 100 MG/1
CAPSULE ORAL
COMMUNITY
Start: 2021-12-09

## 2021-12-23 RX ORDER — FLUOXETINE 10 MG/1
CAPSULE ORAL
COMMUNITY
Start: 2021-12-09

## 2021-12-23 RX ADMIN — LIDOCAINE HYDROCHLORIDE 1 ML: 10 INJECTION, SOLUTION EPIDURAL; INFILTRATION; INTRACAUDAL; PERINEURAL at 09:16

## 2021-12-23 RX ADMIN — LIDOCAINE HYDROCHLORIDE AND EPINEPHRINE 1 ML: 10; 10 INJECTION, SOLUTION INFILTRATION; PERINEURAL at 09:17

## 2021-12-30 ENCOUNTER — OFFICE VISIT (OUTPATIENT)
Dept: PODIATRY | Facility: CLINIC | Age: 21
End: 2021-12-30

## 2021-12-30 VITALS
DIASTOLIC BLOOD PRESSURE: 84 MMHG | HEIGHT: 62 IN | WEIGHT: 202 LBS | HEART RATE: 75 BPM | BODY MASS INDEX: 37.17 KG/M2 | SYSTOLIC BLOOD PRESSURE: 135 MMHG

## 2021-12-30 DIAGNOSIS — L60.0 INGROWN TOENAIL: Primary | ICD-10-CM

## 2021-12-30 DIAGNOSIS — M79.675 PAIN IN TOE OF LEFT FOOT: ICD-10-CM

## 2021-12-30 PROCEDURE — 99024 POSTOP FOLLOW-UP VISIT: CPT | Performed by: PODIATRIST

## 2023-02-21 PROBLEM — H61.91 DISORDER OF RIGHT EAR LOBE: Status: RESOLVED | Noted: 2019-06-21 | Resolved: 2023-02-21

## 2023-02-21 PROBLEM — F33.41 RECURRENT MAJOR DEPRESSIVE DISORDER, IN PARTIAL REMISSION (HCC): Status: ACTIVE | Noted: 2021-12-09

## 2023-02-21 RX ORDER — FLUOXETINE HYDROCHLORIDE 40 MG/1
1 CAPSULE ORAL DAILY
COMMUNITY
Start: 2023-02-17 | End: 2023-07-06 | Stop reason: SDUPTHER

## 2023-02-21 RX ORDER — ROSUVASTATIN CALCIUM 10 MG/1
1 TABLET, COATED ORAL DAILY
COMMUNITY
Start: 2023-01-23 | End: 2023-02-24 | Stop reason: SDUPTHER

## 2023-02-24 ENCOUNTER — OFFICE VISIT (OUTPATIENT)
Dept: INTERNAL MEDICINE CLINIC | Facility: CLINIC | Age: 23
End: 2023-02-24

## 2023-02-24 VITALS
HEIGHT: 62 IN | RESPIRATION RATE: 16 BRPM | TEMPERATURE: 97.2 F | HEART RATE: 113 BPM | WEIGHT: 204.6 LBS | BODY MASS INDEX: 37.65 KG/M2 | DIASTOLIC BLOOD PRESSURE: 76 MMHG | OXYGEN SATURATION: 97 % | SYSTOLIC BLOOD PRESSURE: 114 MMHG

## 2023-02-24 DIAGNOSIS — G47.30 MILD SLEEP APNEA: ICD-10-CM

## 2023-02-24 DIAGNOSIS — E78.2 MIXED HYPERLIPIDEMIA: Primary | ICD-10-CM

## 2023-02-24 DIAGNOSIS — I10 HYPERTENSION, UNSPECIFIED TYPE: ICD-10-CM

## 2023-02-24 DIAGNOSIS — F41.1 GENERALIZED ANXIETY DISORDER WITH PANIC ATTACKS: ICD-10-CM

## 2023-02-24 DIAGNOSIS — R53.82 CHRONIC FATIGUE: ICD-10-CM

## 2023-02-24 DIAGNOSIS — F41.0 GENERALIZED ANXIETY DISORDER WITH PANIC ATTACKS: ICD-10-CM

## 2023-02-24 RX ORDER — ROSUVASTATIN CALCIUM 20 MG/1
20 TABLET, COATED ORAL DAILY
Qty: 30 TABLET | Refills: 2 | Status: SHIPPED | OUTPATIENT
Start: 2023-02-24

## 2023-02-24 NOTE — PROGRESS NOTES
Name: Gucci Hdz      : 2000      MRN: [id number]  Encounter Provider: Ramandeep Courtney DO  Encounter Date: 2023   Encounter department: [address]     1  Mixed hyperlipidemia  Assessment & Plan: With familial component as her father has similar hypertriglyceridemia  No hx of pancreatitis in herself or her father  On Tricor and Crestor 10mg    Plan:  Increase crestor to 20mg   Repeat lipid panel    Orders:  -     rosuvastatin (CRESTOR) 20 MG tablet; Take 1 tablet (20 mg total) by mouth in the morning  -     Hemoglobin A1C; Future  -     Lipid panel; Future    2  Generalized anxiety disorder with panic attacks  Assessment & Plan:  Currently well controlled on her regimen, without panic attacks for several years  - Continue fluoxetine 40 mg daily for depression/anxiety  - Continue bupropion/Wellbutrin 150 mg daily for depression  - Follow-up in 3 months      3  Chronic fatigue  -     Ambulatory Referral to Sleep Medicine; Future  -     Vitamin D 25 hydroxy; Future  -     Iron Panel (Includes Ferritin, Iron Sat%, Iron, and TIBC); Future  -     Vitamin B12/Folate, Serum Panel; Future  -     Methylmalonic acid, serum; Future    4  Hypertension, unspecified type  Assessment & Plan:  Well controlled on lisinopril 5mg      5  Mild sleep apnea  Assessment & Plan:  Dx with home study, AHI 6 9   Referral to Sleep Medicine placed        BMI Counseling: Body mass index is 37 42 kg/m²  The BMI is above normal  Nutrition recommendations include encouraging healthy choices of fruits and vegetables, consuming healthier snacks and moderation in carbohydrate intake  Exercise recommendations include moderate physical activity 150 minutes/week  Rationale for BMI follow-up plan is due to patient being overweight or obese  Depression Screening and Follow-up Plan: Patient assessed for underlying major depression   Brief counseling provided and recommend additional follow-up/re-evaluation next office visit  Nutrition Assessment and Intervention:     New Nutrition Prescription completed with patient      Physical Activity Assessment and Intervention:    Physical Activity Prescription completed with patient      Other interventions: Patient will exercise 2x a week    Emotional and Mental Well-being, Sleep, Connectedness Assessment and Intervention:    Sleep/stress assessment performed    Counseled regarding sleep hygiene and aspects of healthy sleep        Subjective      [place] presents today to establish care with us as her insurance switched  She was being seen previously by [facility] internal medicine  Sharon's biggest concern is regarding her chronic fatigue  She sleeps ~10hrs a night, without any issues falling or staying asleep but still has significant daytime fatigue  She did an at home sleep study that showed mild sleep apnea, but hasn't followed up with sleep medicine yet  Her sleep hygiene can be improved with less naps, but around bedtime her hygiene is good  She does not have bright lights on and usually reads before bed, however this sometimes leads to her delaying sleep  She does have a family hx of pernicious anemia  She takes vitamin D daily, and her thyroid function tests have always been normal      Since starting her new job, [place] has found it difficult to eat well and exercise  She is currently working on improving her diet with less carbs and lower calorie foods, however there are several drug rep lunches provided at her work place  Encouraged avoidance of these meals as they are often high calorie  As far as exercise goes, she will try to go 2x a week as she holds a gym membership  Her mood has been stable, she has not had any panic attacks in a few years and her anxiety is well controlled on her Prozac and Wellbutrin  [place] also has a familial mixed hyperlipidemia being treated with Crestor and Tricor   Recent labs in October 2022 were drawn while she was being treated with these medications  Review of Systems   Constitutional: Positive for fatigue  Negative for chills and fever  HENT: Negative for ear pain and sore throat  Eyes: Negative for pain and visual disturbance  Respiratory: Negative for cough and shortness of breath  Cardiovascular: Negative for chest pain and palpitations  Gastrointestinal: Negative for abdominal pain and vomiting  Genitourinary: Negative for dysuria, hematuria, vaginal bleeding, vaginal discharge and vaginal pain  Musculoskeletal: Negative for arthralgias and back pain  Skin: Negative for color change and rash  Neurological: Negative for dizziness, seizures, syncope, light-headedness, numbness and headaches  Psychiatric/Behavioral: Negative for confusion, decreased concentration and sleep disturbance  The patient is not nervous/anxious  All other systems reviewed and are negative        Current Outpatient Medications on File Prior to Visit   Medication Sig   • BLISOVI 24 FE 1-20 MG-MCG(24) per tablet    • buPROPion (WELLBUTRIN XL) 150 mg 24 hr tablet Take 150 mg by mouth daily   • Cholecalciferol (VITAMIN D PO) Take by mouth   • EPINEPHrine (EPIPEN) 0 3 mg/0 3 mL SOAJ Inject as directed   • fenofibrate (TRICOR) 145 mg tablet Take 145 mg by mouth daily   • FLUoxetine (PROzac) 40 MG capsule Take 1 capsule by mouth in the morning   • lisinopril (ZESTRIL) 5 mg tablet Take 5 mg by mouth daily   • Multiple Vitamins-Minerals (MULTIVITAMIN GUMMIES ADULTS PO) Take by mouth   • omega-3-acid ethyl esters (LOVAZA) 1 g capsule TAKE 2 CAPSULES (2 G TOTAL) BY MOUTH 2 (TWO) TIMES A DAY   • [DISCONTINUED] rosuvastatin (CRESTOR) 10 MG tablet Take 1 tablet by mouth in the morning   • albuterol (PROVENTIL HFA,VENTOLIN HFA) 90 mcg/act inhaler Inhale 2 puffs every 4 (four) hours as needed for wheezing (Patient not taking: Reported on 2/24/2023)   • mometasone (NASONEX) 50 mcg/act nasal spray SPRAY 2 SPRAYS INTO EACH NOSTRIL EVERY DAY (Patient not taking: Reported on 2/24/2023)   • tretinoin (RETIN-A) 0 025 % cream  (Patient not taking: Reported on 2/24/2023)       Objective     /76   Pulse (!) 113   Temp (!) 97 2 °F (36 2 °C)   Resp 16   Ht 5' 2" (1 575 m)   Wt 92 8 kg (204 lb 9 6 oz)   SpO2 97%   BMI 37 42 kg/m²     Physical Exam  Vitals reviewed  Constitutional:       General: She is not in acute distress  Appearance: Normal appearance  HENT:      Head: Normocephalic and atraumatic  Eyes:      Extraocular Movements: Extraocular movements intact  Pupils: Pupils are equal, round, and reactive to light  Cardiovascular:      Rate and Rhythm: Normal rate and regular rhythm  Pulses: Normal pulses  Heart sounds: Normal heart sounds  Pulmonary:      Effort: Pulmonary effort is normal  No respiratory distress  Breath sounds: Normal breath sounds  No wheezing  Abdominal:      General: Abdomen is flat  Bowel sounds are normal  There is no distension  Palpations: Abdomen is soft  Tenderness: There is no abdominal tenderness  Musculoskeletal:         General: No swelling or tenderness  Cervical back: Neck supple  No tenderness  Right lower leg: No edema  Left lower leg: No edema  Lymphadenopathy:      Cervical: No cervical adenopathy  Skin:     General: Skin is warm and dry  Coloration: Skin is not pale  Findings: No rash  Neurological:      General: No focal deficit present  Mental Status: She is alert and oriented to person, place, and time  Motor: No weakness     Psychiatric:         Mood and Affect: Mood normal        Darya Barba DO

## 2023-02-24 NOTE — ASSESSMENT & PLAN NOTE
Currently well controlled on her regimen, without panic attacks for several years       - Continue fluoxetine 40 mg daily for depression/anxiety  - Continue bupropion/Wellbutrin 150 mg daily for depression  - Follow-up in 3 months

## 2023-02-24 NOTE — ASSESSMENT & PLAN NOTE
With familial component as her father has similar hypertriglyceridemia  No hx of pancreatitis in herself or her father       On Tricor and Crestor 10mg    Plan:  Increase crestor to 20mg   Repeat lipid panel

## 2023-03-15 DIAGNOSIS — E78.2 MIXED HYPERLIPIDEMIA: Primary | ICD-10-CM

## 2023-03-15 RX ORDER — FENOFIBRATE 160 MG/1
160 TABLET ORAL DAILY
Qty: 90 TABLET | Refills: 3 | Status: SHIPPED | OUTPATIENT
Start: 2023-03-15

## 2023-03-20 DIAGNOSIS — E78.2 MIXED HYPERLIPIDEMIA: ICD-10-CM

## 2023-03-20 RX ORDER — ROSUVASTATIN CALCIUM 20 MG/1
20 TABLET, COATED ORAL DAILY
Qty: 90 TABLET | Refills: 1 | Status: SHIPPED | OUTPATIENT
Start: 2023-03-20

## 2023-05-24 DIAGNOSIS — E61.1 IRON DEFICIENCY: Primary | ICD-10-CM

## 2023-05-24 RX ORDER — NORETHINDRONE ACETATE AND ETHINYL ESTRADIOL 1MG-20(24)
1 KIT ORAL DAILY
Qty: 30 TABLET | Refills: 5 | Status: SHIPPED | OUTPATIENT
Start: 2023-05-24

## 2023-06-02 ENCOUNTER — RA CDI HCC (OUTPATIENT)
Dept: OTHER | Facility: HOSPITAL | Age: 23
End: 2023-06-02

## 2023-06-02 NOTE — PROGRESS NOTES
[facility] 75  coding opportunities          Chart Reviewed number of suggestions sent to Provider: 2   [phone]  [phone]      Patients Insurance        Commercial Insurance: [employer]

## 2023-06-07 ENCOUNTER — APPOINTMENT (OUTPATIENT)
Dept: LAB | Age: 23
End: 2023-06-07
Payer: COMMERCIAL

## 2023-06-07 DIAGNOSIS — R53.82 CHRONIC FATIGUE: ICD-10-CM

## 2023-06-07 DIAGNOSIS — E78.2 MIXED HYPERLIPIDEMIA: ICD-10-CM

## 2023-06-07 LAB
25(OH)D3 SERPL-MCNC: 29.9 NG/ML (ref 30–100)
CHOLEST SERPL-MCNC: 164 MG/DL
EST. AVERAGE GLUCOSE BLD GHB EST-MCNC: 103 MG/DL
FERRITIN SERPL-MCNC: 187 NG/ML (ref 11–307)
FOLATE SERPL-MCNC: 12.3 NG/ML
HBA1C MFR BLD: 5.2 %
HDLC SERPL-MCNC: 41 MG/DL
IRON SATN MFR SERPL: 14 % (ref 15–50)
IRON SERPL-MCNC: 77 UG/DL (ref 50–170)
NONHDLC SERPL-MCNC: 123 MG/DL
TIBC SERPL-MCNC: 536 UG/DL (ref 250–450)
TRIGL SERPL-MCNC: 403 MG/DL
VIT B12 SERPL-MCNC: 343 PG/ML (ref 180–914)

## 2023-06-07 PROCEDURE — 83540 ASSAY OF IRON: CPT

## 2023-06-07 PROCEDURE — 83918 ORGANIC ACIDS TOTAL QUANT: CPT

## 2023-06-07 PROCEDURE — 36415 COLL VENOUS BLD VENIPUNCTURE: CPT

## 2023-06-07 PROCEDURE — 83550 IRON BINDING TEST: CPT

## 2023-06-07 PROCEDURE — 82607 VITAMIN B-12: CPT

## 2023-06-07 PROCEDURE — 82746 ASSAY OF FOLIC ACID SERUM: CPT

## 2023-06-07 PROCEDURE — 80061 LIPID PANEL: CPT

## 2023-06-07 PROCEDURE — 83036 HEMOGLOBIN GLYCOSYLATED A1C: CPT

## 2023-06-07 PROCEDURE — 82306 VITAMIN D 25 HYDROXY: CPT

## 2023-06-07 PROCEDURE — 82728 ASSAY OF FERRITIN: CPT

## 2023-06-09 ENCOUNTER — OFFICE VISIT (OUTPATIENT)
Dept: INTERNAL MEDICINE CLINIC | Facility: CLINIC | Age: 23
End: 2023-06-09
Payer: COMMERCIAL

## 2023-06-09 VITALS
HEIGHT: 62 IN | HEART RATE: 102 BPM | SYSTOLIC BLOOD PRESSURE: 124 MMHG | BODY MASS INDEX: 37.17 KG/M2 | DIASTOLIC BLOOD PRESSURE: 92 MMHG | WEIGHT: 202 LBS | OXYGEN SATURATION: 97 % | TEMPERATURE: 97.8 F

## 2023-06-09 DIAGNOSIS — E55.9 VITAMIN D DEFICIENCY: ICD-10-CM

## 2023-06-09 DIAGNOSIS — E78.2 MIXED HYPERLIPIDEMIA: ICD-10-CM

## 2023-06-09 DIAGNOSIS — F41.0 GENERALIZED ANXIETY DISORDER WITH PANIC ATTACKS: ICD-10-CM

## 2023-06-09 DIAGNOSIS — E66.9 CLASS 2 OBESITY WITHOUT SERIOUS COMORBIDITY WITH BODY MASS INDEX (BMI) OF 36.0 TO 36.9 IN ADULT, UNSPECIFIED OBESITY TYPE: ICD-10-CM

## 2023-06-09 DIAGNOSIS — E61.1 IRON DEFICIENCY: Primary | ICD-10-CM

## 2023-06-09 DIAGNOSIS — F41.1 GENERALIZED ANXIETY DISORDER WITH PANIC ATTACKS: ICD-10-CM

## 2023-06-09 DIAGNOSIS — G47.30 MILD SLEEP APNEA: ICD-10-CM

## 2023-06-09 PROBLEM — N91.2 AMENORRHEA: Status: RESOLVED | Noted: 2019-06-21 | Resolved: 2023-06-09

## 2023-06-09 PROCEDURE — 99214 OFFICE O/P EST MOD 30 MIN: CPT | Performed by: INTERNAL MEDICINE

## 2023-06-09 RX ORDER — MULTIVIT-MIN/IRON/FOLIC ACID/K 18-600-40
1 CAPSULE ORAL DAILY
Start: 2023-06-09

## 2023-06-09 RX ORDER — BUPROPION HYDROCHLORIDE 150 MG/1
150 TABLET ORAL DAILY
Qty: 30 TABLET | Refills: 2 | Status: SHIPPED | OUTPATIENT
Start: 2023-06-09

## 2023-06-09 NOTE — ASSESSMENT & PLAN NOTE
· Patient's iron was normal, however TIBC was high  She is not currently on Iron supplements       Plan:  · Start over the counter Iron tablets three times weekly   · With Vitamin C

## 2023-06-09 NOTE — ASSESSMENT & PLAN NOTE
- Currently worse, but patient is going through life change with her parents moving to [facility]   - Patient would like time to improve lifestyle prior to increasing/changing medication regimen    Plan:  Reassess in 3 months

## 2023-06-09 NOTE — PROGRESS NOTES
Name: Betsy Myrick      : 2000      MRN: [id number]  Encounter Provider: Elvi Brennan DO  Encounter Date: 2023   Encounter department: [address]     1  Iron deficiency  Assessment & Plan:  Patient's iron was normal, however TIBC was high  She is not currently on Iron supplements  Plan:  Start over the counter Iron tablets three times weekly   With Vitamin C    Orders:  -     CBC and differential  -     Iron Panel (Includes Ferritin, Iron Sat%, Iron, and TIBC); Future    2  Vitamin D deficiency  -     Vitamin D 25 hydroxy  -     Cholecalciferol (Vitamin D) 50 MCG (2000 UT) CAPS; Take 1 capsule (2,000 Units total) by mouth in the morning    3  Mild sleep apnea  Assessment & Plan:  Sleep study in 2023 showed Mild JACKLYN- AHI was 6 9  She has started using a CPAP, but does not use it every day currently    Plan:  Encouraged daily CPAP use    Orders:  -     Comprehensive metabolic panel  -     TSH, 3rd generation; Future; Expected date: 2023  -     T4, free; Future; Expected date: 2023    4  Mixed hyperlipidemia  -     Comprehensive metabolic panel  -     Lipid panel  -     TSH, 3rd generation; Future; Expected date: 2023  -     T4, free; Future; Expected date: 2023    5  Generalized anxiety disorder with panic attacks  Assessment & Plan:  - Currently worse, but patient is going through life change with her parents moving to [facility]   - Patient would like time to improve lifestyle prior to increasing/changing medication regimen    Plan:  Reassess in 3 months    Orders:  -     buPROPion (WELLBUTRIN XL) 150 mg 24 hr tablet; Take 1 tablet (150 mg total) by mouth daily  -     TSH, 3rd generation; Future; Expected date: 2023  -     T4, free; Future; Expected date: 2023    6   Class 2 obesity without serious comorbidity with body mass index (BMI) of 36 0 to 36 9 in adult, unspecified obesity type  -     TSH, 3rd generation; Future; Expected date: 09/09/2023  -     T4, free; Future; Expected date: 09/09/2023      BMI Counseling: Body mass index is 36 95 kg/m²  The BMI is above normal  Nutrition recommendations include encouraging healthy choices of fruits and vegetables  Exercise recommendations include exercising 3-5 times per week  Rationale for BMI follow-up plan is due to patient being overweight or obese  Depression Screening and Follow-up Plan: Their PHQ-9 score was 8  Patient assessed for underlying major depression  Brief counseling provided and recommend additional follow-up/re-evaluation next office visit  Patient may need increase in her SSRI, she has tried to increase her wellbutrin in the past with worsening of symptoms leading to having to go to the emergency room        Subjective      Ashley Bardales is here for a follow up visit, she is feeling alright  Her parents just moved to [facility] last month, so she is living alone in the home with her dog  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 [facility]  She however is having some worsening of her anxiety during this transition period after her parents' move  She has been waking up nauseous with some dry heaving/one episode of vomit  This does not occur daily, but often  Ashley Bardales is trying to spend more time on herself including improving her diet, exercise, and self-care  She and her boyfriend have a plan to improve their diet starting this weekend  She is also going to try to spend more time with friends and walk her dog  The patient is having frequent loose stools, however she feels that this is related to her diet as it is high in dairy- milk and cheese  She is planning to wean back her dairy intake and will see how the stools improve  If she has persistent diarrhea, given iron panel, might pursue GI workup  Review of Systems   Constitutional: Positive for fatigue  Negative for activity change and chills     Respiratory: Negative for cough, "chest tightness and shortness of breath  Cardiovascular: Negative for chest pain, palpitations and leg swelling  Gastrointestinal: Positive for diarrhea  Negative for abdominal pain, constipation, nausea and vomiting  Genitourinary: Negative for difficulty urinating and dyspareunia  Musculoskeletal: Negative for back pain  Neurological: Negative for dizziness, weakness, numbness and headaches  Psychiatric/Behavioral: The patient is nervous/anxious  Current Outpatient Medications on File Prior to Visit   Medication Sig   • Blisovi 24 Fe 1-20 MG-MCG(24) per tablet Take 1 tablet by mouth daily Pt takes no placebos   • Cholecalciferol (VITAMIN D PO) Take by mouth   • EPINEPHrine (EPIPEN) 0 3 mg/0 3 mL SOAJ Inject as directed   • fenofibrate 160 MG tablet Take 1 tablet (160 mg total) by mouth daily   • FLUoxetine (PROzac) 40 MG capsule Take 1 capsule by mouth in the morning   • lisinopril (ZESTRIL) 5 mg tablet Take 5 mg by mouth daily   • Multiple Vitamins-Minerals (MULTIVITAMIN GUMMIES ADULTS PO) Take by mouth   • rosuvastatin (CRESTOR) 20 MG tablet Take 1 tablet (20 mg total) by mouth in the morning   • [DISCONTINUED] buPROPion (WELLBUTRIN XL) 150 mg 24 hr tablet Take 150 mg by mouth daily   • omega-3-acid ethyl esters (LOVAZA) 1 g capsule TAKE 2 CAPSULES (2 G TOTAL) BY MOUTH 2 (TWO) TIMES A DAY (Patient not taking: Reported on 6/9/2023)       Objective     /92 (BP Location: Left arm, Patient Position: Sitting, Cuff Size: Large)   Pulse 102   Temp 97 8 °F (36 6 °C)   Ht 5' 2\" (1 575 m)   Wt 91 6 kg (202 lb)   SpO2 97%   BMI 36 95 kg/m²     Physical Exam  Vitals reviewed  Constitutional:       General: She is not in acute distress  Appearance: Normal appearance  HENT:      Head: Normocephalic and atraumatic  Eyes:      Extraocular Movements: Extraocular movements intact  Pupils: Pupils are equal, round, and reactive to light     Cardiovascular:      Rate and Rhythm: Normal " rate and regular rhythm  Pulses: Normal pulses  Heart sounds: Normal heart sounds  Pulmonary:      Effort: Pulmonary effort is normal       Breath sounds: Normal breath sounds  Abdominal:      General: Abdomen is flat  Bowel sounds are normal  There is no distension  Palpations: Abdomen is soft  Tenderness: There is no abdominal tenderness  Musculoskeletal:         General: No swelling or tenderness  Cervical back: Neck supple  Right lower leg: No edema  Left lower leg: No edema  Skin:     General: Skin is warm and dry  Coloration: Skin is not pale  Findings: No rash  Neurological:      Mental Status: She is alert and oriented to person, place, and time  Motor: No weakness     Psychiatric:         Mood and Affect: Mood normal        Elvi Brennan DO

## 2023-06-09 NOTE — ASSESSMENT & PLAN NOTE
· Sleep study in 2/2023 showed Mild JACKLYN- AHI was 6 9  · She has started using a CPAP, but does not use it every day currently    Plan:  Encouraged daily CPAP use

## 2023-06-12 LAB — METHYLMALONATE SERPL-SCNC: 88 NMOL/L (ref 0–378)

## 2023-06-23 ENCOUNTER — RA CDI HCC (OUTPATIENT)
Dept: OTHER | Facility: HOSPITAL | Age: 23
End: 2023-06-23

## 2023-06-23 NOTE — PROGRESS NOTES
[facility]  coding opportunities       Chart reviewed, no opportunity found: CHART REVIEWED, NO OPPORTUNITY FOUND        Patients Insurance        Commercial Insurance: [address] [place] [address]

## 2023-07-06 DIAGNOSIS — F41.0 GENERALIZED ANXIETY DISORDER WITH PANIC ATTACKS: ICD-10-CM

## 2023-07-06 DIAGNOSIS — F41.1 GENERALIZED ANXIETY DISORDER WITH PANIC ATTACKS: ICD-10-CM

## 2023-07-06 RX ORDER — FLUOXETINE HYDROCHLORIDE 40 MG/1
40 CAPSULE ORAL DAILY
Qty: 90 CAPSULE | Refills: 1 | Status: SHIPPED | OUTPATIENT
Start: 2023-07-06

## 2023-07-06 RX ORDER — BUPROPION HYDROCHLORIDE 150 MG/1
150 TABLET ORAL DAILY
Qty: 30 TABLET | Refills: 2 | Status: SHIPPED | OUTPATIENT
Start: 2023-07-06

## 2023-09-20 DIAGNOSIS — E78.2 MIXED HYPERLIPIDEMIA: ICD-10-CM

## 2023-09-20 RX ORDER — ROSUVASTATIN CALCIUM 20 MG/1
20 TABLET, COATED ORAL DAILY
Qty: 90 TABLET | Refills: 1 | Status: SHIPPED | OUTPATIENT
Start: 2023-09-20

## 2023-10-01 DIAGNOSIS — F41.0 GENERALIZED ANXIETY DISORDER WITH PANIC ATTACKS: ICD-10-CM

## 2023-10-01 DIAGNOSIS — F41.1 GENERALIZED ANXIETY DISORDER WITH PANIC ATTACKS: ICD-10-CM

## 2023-10-02 DIAGNOSIS — E61.1 IRON DEFICIENCY: ICD-10-CM

## 2023-10-02 RX ORDER — NORETHINDRONE ACETATE AND ETHINYL ESTRADIOL 1MG-20(24)
KIT ORAL
Qty: 28 TABLET | Refills: 5 | Status: SHIPPED | OUTPATIENT
Start: 2023-10-02

## 2023-10-02 RX ORDER — BUPROPION HYDROCHLORIDE 150 MG/1
150 TABLET ORAL EVERY MORNING
Qty: 90 TABLET | Refills: 1 | Status: SHIPPED | OUTPATIENT
Start: 2023-10-02

## 2024-01-04 DIAGNOSIS — F41.0 GENERALIZED ANXIETY DISORDER WITH PANIC ATTACKS: ICD-10-CM

## 2024-01-04 DIAGNOSIS — F41.1 GENERALIZED ANXIETY DISORDER WITH PANIC ATTACKS: ICD-10-CM

## 2024-01-04 RX ORDER — FLUOXETINE HYDROCHLORIDE 40 MG/1
40 CAPSULE ORAL DAILY
Qty: 90 CAPSULE | Refills: 1 | Status: SHIPPED | OUTPATIENT
Start: 2024-01-04

## 2024-01-06 DIAGNOSIS — E78.2 MIXED HYPERLIPIDEMIA: ICD-10-CM

## 2024-01-08 DIAGNOSIS — F41.1 GENERALIZED ANXIETY DISORDER WITH PANIC ATTACKS: ICD-10-CM

## 2024-01-08 DIAGNOSIS — F41.0 GENERALIZED ANXIETY DISORDER WITH PANIC ATTACKS: ICD-10-CM

## 2024-01-08 DIAGNOSIS — E78.2 MIXED HYPERLIPIDEMIA: ICD-10-CM

## 2024-01-08 RX ORDER — FENOFIBRATE 160 MG/1
160 TABLET ORAL DAILY
Qty: 90 TABLET | Refills: 3 | Status: SHIPPED | OUTPATIENT
Start: 2024-01-08

## 2024-01-08 NOTE — TELEPHONE ENCOUNTER
Requested medication(s) are due for refill today: Yes  Patient has already received a courtesy refill: No  Other reason request has been forwarded to provider:

## 2024-01-09 RX ORDER — BUPROPION HYDROCHLORIDE 150 MG/1
150 TABLET ORAL EVERY MORNING
Qty: 90 TABLET | Refills: 1 | Status: SHIPPED | OUTPATIENT
Start: 2024-01-09

## 2024-01-09 RX ORDER — ROSUVASTATIN CALCIUM 20 MG/1
20 TABLET, COATED ORAL DAILY
Qty: 90 TABLET | Refills: 1 | Status: SHIPPED | OUTPATIENT
Start: 2024-01-09

## 2024-02-13 ENCOUNTER — TELEMEDICINE (OUTPATIENT)
Dept: INTERNAL MEDICINE CLINIC | Facility: CLINIC | Age: 24
End: 2024-02-13
Payer: COMMERCIAL

## 2024-02-13 DIAGNOSIS — G47.30 MILD SLEEP APNEA: ICD-10-CM

## 2024-02-13 DIAGNOSIS — F41.0 GENERALIZED ANXIETY DISORDER WITH PANIC ATTACKS: Primary | ICD-10-CM

## 2024-02-13 DIAGNOSIS — E61.1 IRON DEFICIENCY: ICD-10-CM

## 2024-02-13 DIAGNOSIS — F41.1 GENERALIZED ANXIETY DISORDER WITH PANIC ATTACKS: Primary | ICD-10-CM

## 2024-02-13 DIAGNOSIS — F33.41 RECURRENT MAJOR DEPRESSIVE DISORDER, IN PARTIAL REMISSION (HCC): ICD-10-CM

## 2024-02-13 DIAGNOSIS — E55.9 VITAMIN D DEFICIENCY: ICD-10-CM

## 2024-02-13 DIAGNOSIS — E78.2 MIXED HYPERLIPIDEMIA: ICD-10-CM

## 2024-02-13 PROCEDURE — 99213 OFFICE O/P EST LOW 20 MIN: CPT | Performed by: INTERNAL MEDICINE

## 2024-02-13 NOTE — PROGRESS NOTES
Virtual Regular Visit    Verification of patient location:    Patient is located at Home in the following state in which I hold an active license [place]      Assessment/Plan:    Problem List Items Addressed This Visit          Respiratory    Mild sleep apnea       Other    Recurrent major depressive disorder, in partial remission (HCC)    Generalized anxiety disorder with panic attacks - Primary    Vitamin D deficiency    Iron deficiency            Reason for visit is   Chief Complaint   Patient presents with    Virtual Regular Visit          Encounter provider Rex Sweeney MD    Provider located at [address] [facility] INTERNAL MEDICINE  [address] [facility] [place] [address]      Recent Visits  No visits were found meeting these conditions.  Showing recent visits within past 7 days and meeting all other requirements  Today's Visits  Date Type Provider Dept   02/13/24 Telemedicine Rex Sweeney MD [facility] Internal [facility]   Showing today's visits and meeting all other requirements  Future Appointments  No visits were found meeting these conditions.  Showing future appointments within next 150 days and meeting all other requirements       The patient was identified by name and date of birth. Ashley Garner was informed that this is a telemedicine visit and that the visit is being conducted through the E-[employer] platform. She agrees to proceed..  My office door was closed. No one else was in the room.  She acknowledged consent and understanding of privacy and security of the video platform. The patient has agreed to participate and understands they can discontinue the visit at any time.    Patient is aware this is a billable service.     Subjective  Ashley Garner is a 23 y.o. female for follow up to initial visit on June 6, 2023      JO Ramirez has moved to an apartment in [place]. Her parents moved to [place] and she will be visiting them soon before a trip to [facility].     She  will be rescheduling her annual Gyn check up. She has been taking BCP's for several years.     DANIEL/panic attacks: Ashley is adjusting to her parents moving away. She reports that she is doing well on Fluoxetine 40 mg/day and Bupropion  added at last visit. No depression sxs or panic attacks. Plan: continue medication.     Fatigue/JACKLYN: no yet using CPAP and has all of the equipment and has been tested and titrated. I strongly recommended starting CPAP    HLD: continue Crestor 20 mg/day and check lipids ordered on 6/9/23    Vitamin D deficiency: taking Vitamin D 2000 IU/day. Check Vitamin D level    Iron deficiency: she is taking TIW iron with Vitamin C. No excessive menstrual bleeding, on BCP's. Check CBC and iron panel.    Follow up: June, which will be annual px.     Past Medical History:   Diagnosis Date    Abnormal blood chemistry 07/04/2011    lavinia Harris     Allergic rhinitis     Dysuria     urinary    Exercise-induced asthma     Fatigue     last assessed 5/5/15; resolved 1/2/16    Hemangioma of face 2006    chin     History of psychiatric treatment     Hypertension     Nasal fracture 2012    Nevus of head 2006    left cheek       Past Surgical History:   Procedure Laterality Date    WISDOM TOOTH EXTRACTION         Current Outpatient Medications   Medication Sig Dispense Refill    Blisovi 24 Fe 1-20 MG-MCG(24) per tablet TAKE 1 TABLET BY MOUTH EVERY DAY SKIPPING PLACEBOS 28 tablet 5    buPROPion (WELLBUTRIN XL) 150 mg 24 hr tablet TAKE 1 TABLET BY MOUTH EVERY DAY IN THE MORNING 90 tablet 1    Cholecalciferol (VITAMIN D PO) Take by mouth      Cholecalciferol (Vitamin D) 50 MCG (2000 UT) CAPS Take 1 capsule (2,000 Units total) by mouth in the morning      EPINEPHrine (EPIPEN) 0.3 mg/0.3 mL SOAJ Inject as directed      fenofibrate 160 MG tablet TAKE 1 TABLET BY MOUTH EVERY DAY 90 tablet 3    FLUoxetine (PROzac) 40 MG capsule TAKE 1 CAPSULE (40 MG TOTAL) BY MOUTH IN THE MORNING 90 capsule 1     lisinopril (ZESTRIL) 5 mg tablet Take 5 mg by mouth daily      Multiple Vitamins-Minerals (MULTIVITAMIN GUMMIES ADULTS PO) Take by mouth      omega-3-acid ethyl esters (LOVAZA) 1 g capsule TAKE 2 CAPSULES (2 G TOTAL) BY MOUTH 2 (TWO) TIMES A DAY (Patient not taking: Reported on 6/9/2023) 60 capsule 0    rosuvastatin (CRESTOR) 20 MG tablet TAKE 1 TABLET (20 MG TOTAL) BY MOUTH IN THE MORNING 90 tablet 1     No current facility-administered medications for this visit.        Allergies   Allergen Reactions    Dust Mite Extract     Molds & Smuts     Nuts - Food Allergy     Other     Pollen Extract        Review of Systems    Video Exam        Physical Exam     Appears well, in good spiritis    Visit Time  Total Visit Duration: 15 minutes

## 2024-03-30 DIAGNOSIS — E61.1 IRON DEFICIENCY: ICD-10-CM

## 2024-03-31 DIAGNOSIS — I10 PRIMARY HYPERTENSION: Primary | ICD-10-CM

## 2024-04-01 RX ORDER — NORETHINDRONE ACETATE AND ETHINYL ESTRADIOL 1MG-20(24)
KIT ORAL
Qty: 28 TABLET | Refills: 5 | Status: SHIPPED | OUTPATIENT
Start: 2024-04-01

## 2024-04-01 RX ORDER — LISINOPRIL 5 MG/1
5 TABLET ORAL DAILY
Qty: 90 TABLET | Refills: 0 | Status: SHIPPED | OUTPATIENT
Start: 2024-04-01

## 2024-06-02 DIAGNOSIS — E61.1 IRON DEFICIENCY: ICD-10-CM

## 2024-06-03 RX ORDER — NORETHINDRONE ACETATE AND ETHINYL ESTRADIOL 1MG-20(24)
KIT ORAL
Qty: 84 TABLET | Refills: 2 | Status: SHIPPED | OUTPATIENT
Start: 2024-06-03

## 2024-07-01 DIAGNOSIS — F41.1 GENERALIZED ANXIETY DISORDER WITH PANIC ATTACKS: ICD-10-CM

## 2024-07-01 DIAGNOSIS — F41.0 GENERALIZED ANXIETY DISORDER WITH PANIC ATTACKS: ICD-10-CM

## 2024-07-01 RX ORDER — FLUOXETINE HYDROCHLORIDE 40 MG/1
40 CAPSULE ORAL DAILY
Qty: 90 CAPSULE | Refills: 1 | Status: SHIPPED | OUTPATIENT
Start: 2024-07-01

## 2024-07-27 ENCOUNTER — DOCUMENTATION (OUTPATIENT)
Dept: MULTI SPECIALTY CLINIC | Facility: CLINIC | Age: 24
End: 2024-07-27

## 2024-07-27 ENCOUNTER — APPOINTMENT (OUTPATIENT)
Dept: LAB | Facility: MEDICAL CENTER | Age: 24
End: 2024-07-27
Payer: COMMERCIAL

## 2024-07-27 DIAGNOSIS — E55.9 VITAMIN D DEFICIENCY: ICD-10-CM

## 2024-07-27 DIAGNOSIS — E61.1 IRON DEFICIENCY: Primary | ICD-10-CM

## 2024-07-27 DIAGNOSIS — I10 PRIMARY HYPERTENSION: ICD-10-CM

## 2024-07-27 DIAGNOSIS — E78.2 MIXED HYPERLIPIDEMIA: ICD-10-CM

## 2024-07-27 DIAGNOSIS — E61.1 IRON DEFICIENCY: ICD-10-CM

## 2024-07-27 LAB
25(OH)D3 SERPL-MCNC: 35.6 NG/ML (ref 30–100)
ALBUMIN SERPL BCG-MCNC: 4.4 G/DL (ref 3.5–5)
ALP SERPL-CCNC: 35 U/L (ref 34–104)
ALT SERPL W P-5'-P-CCNC: 19 U/L (ref 7–52)
ANION GAP SERPL CALCULATED.3IONS-SCNC: 9 MMOL/L (ref 4–13)
AST SERPL W P-5'-P-CCNC: 23 U/L (ref 13–39)
BASOPHILS # BLD AUTO: 0.03 THOUSANDS/ÂΜL (ref 0–0.1)
BASOPHILS NFR BLD AUTO: 0 % (ref 0–1)
BILIRUB SERPL-MCNC: 0.32 MG/DL (ref 0.2–1)
BUN SERPL-MCNC: 14 MG/DL (ref 5–25)
CALCIUM SERPL-MCNC: 9.9 MG/DL (ref 8.4–10.2)
CHLORIDE SERPL-SCNC: 104 MMOL/L (ref 96–108)
CHOLEST SERPL-MCNC: 139 MG/DL
CO2 SERPL-SCNC: 25 MMOL/L (ref 21–32)
CREAT SERPL-MCNC: 0.78 MG/DL (ref 0.6–1.3)
EOSINOPHIL # BLD AUTO: 0.12 THOUSAND/ÂΜL (ref 0–0.61)
EOSINOPHIL NFR BLD AUTO: 2 % (ref 0–6)
ERYTHROCYTE [DISTWIDTH] IN BLOOD BY AUTOMATED COUNT: 12.3 % (ref 11.6–15.1)
GFR SERPL CREATININE-BSD FRML MDRD: 105 ML/MIN/1.73SQ M
GLUCOSE P FAST SERPL-MCNC: 92 MG/DL (ref 65–99)
HCT VFR BLD AUTO: 41.8 % (ref 34.8–46.1)
HDLC SERPL-MCNC: 52 MG/DL
HGB BLD-MCNC: 13.4 G/DL (ref 11.5–15.4)
IMM GRANULOCYTES # BLD AUTO: 0.03 THOUSAND/UL (ref 0–0.2)
IMM GRANULOCYTES NFR BLD AUTO: 0 % (ref 0–2)
LDLC SERPL CALC-MCNC: 28 MG/DL (ref 0–100)
LYMPHOCYTES # BLD AUTO: 3.04 THOUSANDS/ÂΜL (ref 0.6–4.47)
LYMPHOCYTES NFR BLD AUTO: 41 % (ref 14–44)
MCH RBC QN AUTO: 27.8 PG (ref 26.8–34.3)
MCHC RBC AUTO-ENTMCNC: 32.1 G/DL (ref 31.4–37.4)
MCV RBC AUTO: 87 FL (ref 82–98)
MONOCYTES # BLD AUTO: 0.52 THOUSAND/ÂΜL (ref 0.17–1.22)
MONOCYTES NFR BLD AUTO: 7 % (ref 4–12)
NEUTROPHILS # BLD AUTO: 3.67 THOUSANDS/ÂΜL (ref 1.85–7.62)
NEUTS SEG NFR BLD AUTO: 50 % (ref 43–75)
NONHDLC SERPL-MCNC: 87 MG/DL
NRBC BLD AUTO-RTO: 0 /100 WBCS
PLATELET # BLD AUTO: 361 THOUSANDS/UL (ref 149–390)
PMV BLD AUTO: 9.8 FL (ref 8.9–12.7)
POTASSIUM SERPL-SCNC: 4.2 MMOL/L (ref 3.5–5.3)
PROT SERPL-MCNC: 7.5 G/DL (ref 6.4–8.4)
RBC # BLD AUTO: 4.82 MILLION/UL (ref 3.81–5.12)
SODIUM SERPL-SCNC: 138 MMOL/L (ref 135–147)
TRIGL SERPL-MCNC: 294 MG/DL
WBC # BLD AUTO: 7.41 THOUSAND/UL (ref 4.31–10.16)

## 2024-07-29 ENCOUNTER — OFFICE VISIT (OUTPATIENT)
Dept: INTERNAL MEDICINE CLINIC | Facility: CLINIC | Age: 24
End: 2024-07-29
Payer: COMMERCIAL

## 2024-07-29 VITALS
DIASTOLIC BLOOD PRESSURE: 84 MMHG | WEIGHT: 209 LBS | BODY MASS INDEX: 38.23 KG/M2 | OXYGEN SATURATION: 98 % | TEMPERATURE: 97.8 F | HEART RATE: 109 BPM | SYSTOLIC BLOOD PRESSURE: 120 MMHG

## 2024-07-29 DIAGNOSIS — F41.1 GENERALIZED ANXIETY DISORDER WITH PANIC ATTACKS: ICD-10-CM

## 2024-07-29 DIAGNOSIS — E78.2 MIXED HYPERLIPIDEMIA: ICD-10-CM

## 2024-07-29 DIAGNOSIS — Z00.00 ANNUAL PHYSICAL EXAM: Primary | ICD-10-CM

## 2024-07-29 DIAGNOSIS — I10 PRIMARY HYPERTENSION: ICD-10-CM

## 2024-07-29 DIAGNOSIS — E66.01 CLASS 2 SEVERE OBESITY DUE TO EXCESS CALORIES WITH SERIOUS COMORBIDITY AND BODY MASS INDEX (BMI) OF 36.0 TO 36.9 IN ADULT (HCC): ICD-10-CM

## 2024-07-29 DIAGNOSIS — F41.0 GENERALIZED ANXIETY DISORDER WITH PANIC ATTACKS: ICD-10-CM

## 2024-07-29 DIAGNOSIS — F33.41 RECURRENT MAJOR DEPRESSIVE DISORDER, IN PARTIAL REMISSION (HCC): ICD-10-CM

## 2024-07-29 DIAGNOSIS — G47.30 MILD SLEEP APNEA: ICD-10-CM

## 2024-07-29 PROBLEM — E66.812 CLASS 2 SEVERE OBESITY DUE TO EXCESS CALORIES WITH SERIOUS COMORBIDITY AND BODY MASS INDEX (BMI) OF 36.0 TO 36.9 IN ADULT (HCC): Status: ACTIVE | Noted: 2024-07-29

## 2024-07-29 PROCEDURE — 3725F SCREEN DEPRESSION PERFORMED: CPT | Performed by: INTERNAL MEDICINE

## 2024-07-29 PROCEDURE — 99395 PREV VISIT EST AGE 18-39: CPT | Performed by: INTERNAL MEDICINE

## 2024-07-29 RX ORDER — ROSUVASTATIN CALCIUM 5 MG/1
5 TABLET, COATED ORAL DAILY
Qty: 30 TABLET | Refills: 5 | Status: CANCELLED | OUTPATIENT
Start: 2024-07-29

## 2024-07-29 NOTE — PROGRESS NOTES
INTERNAL MEDICINE OFFICE VISIT       NAME: Ashley Garner  AGE: 23 y.o. SEX: female       : 2000        MRN: [id number]    DATE: 2024  TIME: 9:21 AM    Assessment and Plan   1. Annual physical exam  2. Class 2 severe obesity due to excess calories with serious comorbidity and body mass index (BMI) of 36.0 to 36.9 in adult ([facility])  -     Ambulatory Referral to Weight Management; Future  3. Primary hypertension  4. Mild sleep apnea  5. Mixed hyperlipidemia  -     Lipid panel; Future; Expected date: 10/29/2024  6. Recurrent major depressive disorder, in partial remission (formerly [facility])  -     Ambulatory referral to Psych Services; Future  7. Generalized anxiety disorder with panic attacks  -     Ambulatory referral to Psych Services; Future       Follow-up will be in 6 months.    Lab work in 3 months        Chief Complaint   Annual physical    History of Present Illness   Ashley Garner is a 23 y.o.-year-old female who returns for an annual physical.  Generalized anxiety and depression symptoms are about the same on current medicines.  We discussed counseling and referral was made.  Current issues are stressful job, and being  from her parents moved to [place].  Plans are potential moved to [place].    We discussed weight management to help with class II obesity and I explained potential benefits and program in general and referral was made.    I suggested discontinuance of lisinopril 2.5 mg daily because of young age, and blood pressure just above the normal range.  Plan is lifestyle modification to weight management and tracking of blood pressure to his program at this office.    Sleep apnea: There is difficulty with tolerating the mask but she is doing her best.    Hyperlipidemia: Recent labs of  included lipid panel with very low LDL 52 on statin therapy.  The risk panel does not warrant statin therapy at this time.  Discontinue Crestor, recheck lipid panel in 3 months,  work on lifestyle through weight management.  Further advise once repeat labs are available.    Continue fenofibrate because of extremely high triglycerides without treatment and family history of such.    Review of Systems   Review of Systems   Psychiatric/Behavioral:  Positive for dysphoric mood. Negative for sleep disturbance and suicidal ideas. The patient is nervous/anxious.    All other systems reviewed and are negative.      Active Problem List     Patient Active Problem List   Diagnosis    Allergic rhinitis    Exercise-induced asthma    Recurrent major depressive disorder, in partial remission (HCC)    Generalized anxiety disorder with panic attacks    Mixed hyperlipidemia    Primary hypertension    Mild sleep apnea    Vitamin D deficiency    Iron deficiency    Class 2 severe obesity due to excess calories with serious comorbidity and body mass index (BMI) of 36.0 to 36.9 in adult ([facility])       The following portions of the patient's history were reviewed and updated as appropriate: allergies, current medications, past family history, past medical history, past social history, past surgical history, and problem list.    Objective     Vitals:    07/29/24 0843   BP: 120/84   Pulse: (!) 109   Temp: 97.8 °F (36.6 °C)   SpO2: 98%     Wt Readings from Last 3 Encounters:   07/29/24 94.8 kg (209 lb)   06/09/23 91.6 kg (202 lb)   02/24/23 92.8 kg (204 lb 9.6 oz)       Physical Exam    VSS, afebrile, pulse regular    Alert and Oriented in NAD    HEENT: NCAT, Pupils equal, 3 mm, EOEMI, no icterus or pallor, no iritis or conjunctivitis. No head or neck mass or adenopathy.     Ears:  normal-appearing external auditory canals and tympanic membranes,     Nose: patent nasal passages without polyps or masses, no septal deviation, no nasal deformity,     Oral cavity and throat: normal dentition, no gum disease, normal mucosa, patent airway, no hemorrhages or exudates in the throat.  Exam of salivary glands and ducts are normal.  No submandibular or submental adenopathy    Neck: supple, no trauma or tenderness.  No JVD.  Normal carotid upstroke and descent without bruits.  Trachea midline without stridor.  Thyroid exam normal on inspection and palpation.  No spinal tenderness, full range of motion.    Lymphatics: no adenopathy throughout    Chest:  No trauma or deformity, no supraclavicular adenopathy or bruit.  Chest wall expansion is symmetric and normal, expiratory phase is not prolonged.    Heart:  Regular rate and rhythm, normal S1-S2, no S4-S3, no clicks murmurs or rubs.    Lungs:  Clear to auscultation and normal to percussion.    Back:  No trauma or deformity, no CVA mass or CVA tenderness.    Skin:  No rash or skin malignancy.    Abdomen:  Nondistended, normal bowel sounds, soft nontender without masses bruits organomegaly.  There is no hernia.  There are no surgical scars.    Extremities:  Arterial circulation is symmetrically normal with no clubbing cyanosis or edema.  There are no varicosities, there is no calf tenderness or phlebitic findings.    Joints:  No deformity, swelling, redness, tenderness or increased temperature, no instability.  There are no tophi.    Affect:  Normal    Neurologic:  Normal and stable gait, and otherwise no focal findings as well.    Cognitive:  Intact.       Pertinent Laboratory/Diagnostic Studies:  I have reviewed pertinent labs:  CBC:   Lab Results   Component Value Date    WBC 7.41 07/27/2024    RBC 4.82 07/27/2024    HGB 13.4 07/27/2024    HCT 41.8 07/27/2024    MCV 87 07/27/2024     07/27/2024    MCH 27.8 07/27/2024    MCHC 32.1 07/27/2024    RDW 12.3 07/27/2024    MPV 9.8 07/27/2024    NEUTROABS 3.67 07/27/2024     CMP:   Lab Results   Component Value Date    SODIUM 138 07/27/2024    K 4.2 07/27/2024     07/27/2024    CO2 25 07/27/2024    AGAP 9 07/27/2024    BUN 14 07/27/2024    CREATININE 0.78 07/27/2024    GLUC 87 10/26/2022    GLUF 92 07/27/2024    CALCIUM 9.9 07/27/2024    AST  23 07/27/2024    ALT 19 07/27/2024    ALKPHOS 35 07/27/2024    TP 7.5 07/27/2024    ALB 4.4 07/27/2024    TBILI 0.32 07/27/2024    EGFR 105 07/27/2024     Lipid Profile:   Lab Results   Component Value Date    CHOLESTEROL 139 07/27/2024    HDL 52 07/27/2024    TRIG 294 (H) 07/27/2024    LDLCALC 28 07/27/2024    NONHDLC 87 07/27/2024     Vitamin D Level   Lab Results   Component Value Date    YYZR11YCJHEO 35.6 07/27/2024         Orders Placed This Encounter   Procedures    Lipid panel    Ambulatory Referral to Weight Management    Ambulatory referral to Psych Services       ALLERGIES:  Allergies   Allergen Reactions    Dust Mite Extract     Molds & Smuts     Nuts - Food Allergy     Other     Pollen Extract        Current Medications     Current Outpatient Medications   Medication Sig Dispense Refill    Blisovi 24 Fe 1-20 MG-MCG(24) per tablet TAKE 1 TABLET BY MOUTH EVERY DAY SKIPPING PLACEBOS 84 tablet 2    buPROPion (WELLBUTRIN XL) 150 mg 24 hr tablet TAKE 1 TABLET BY MOUTH EVERY DAY IN THE MORNING 90 tablet 1    Cholecalciferol (VITAMIN D PO) Take by mouth      Cholecalciferol (Vitamin D) 50 MCG (2000 UT) CAPS Take 1 capsule (2,000 Units total) by mouth in the morning      EPINEPHrine (EPIPEN) 0.3 mg/0.3 mL SOAJ Inject as directed      fenofibrate 160 MG tablet TAKE 1 TABLET BY MOUTH EVERY DAY 90 tablet 3    FLUoxetine (PROzac) 40 MG capsule TAKE 1 CAPSULE (40 MG TOTAL) BY MOUTH IN THE MORNING 90 capsule 1    Multiple Vitamins-Minerals (MULTIVITAMIN GUMMIES ADULTS PO) Take by mouth       No current facility-administered medications for this visit.         Health Maintenance        Rex Sweeney MD

## 2024-07-30 ENCOUNTER — TELEPHONE (OUTPATIENT)
Age: 24
End: 2024-07-30

## 2024-07-30 NOTE — TELEPHONE ENCOUNTER
Writer attempted to contact pt regarding referral for [facility] to verify services needed and offer to add her to wait list. Lvm to call writer back.

## 2024-08-02 NOTE — TELEPHONE ENCOUNTER
2nd attempt to contact ptPal Hardy to call writer back.   Modified Advancement Flap Text: The defect edges were debeveled with a #15 scalpel blade.  Given the location of the defect, shape of the defect and the proximity to free margins a modified advancement flap was deemed most appropriate.  Using a sterile surgical marker, an appropriate advancement flap was drawn incorporating the defect and placing the expected incisions within the relaxed skin tension lines where possible.    The area thus outlined was incised deep to adipose tissue with a #15 scalpel blade.  The skin margins were undermined to an appropriate distance in all directions utilizing iris scissors.

## 2024-11-08 DIAGNOSIS — F41.0 GENERALIZED ANXIETY DISORDER WITH PANIC ATTACKS: ICD-10-CM

## 2024-11-08 DIAGNOSIS — F41.1 GENERALIZED ANXIETY DISORDER WITH PANIC ATTACKS: ICD-10-CM

## 2024-11-08 DIAGNOSIS — E78.2 MIXED HYPERLIPIDEMIA: ICD-10-CM

## 2024-11-08 RX ORDER — ROSUVASTATIN CALCIUM 20 MG/1
20 TABLET, COATED ORAL DAILY
Qty: 90 TABLET | Refills: 1 | Status: SHIPPED | OUTPATIENT
Start: 2024-11-08

## 2024-11-08 RX ORDER — FLUOXETINE 40 MG/1
40 CAPSULE ORAL DAILY
Qty: 90 CAPSULE | Refills: 1 | Status: SHIPPED | OUTPATIENT
Start: 2024-11-08

## 2024-11-23 ENCOUNTER — OFFICE VISIT (OUTPATIENT)
Dept: URGENT CARE | Facility: MEDICAL CENTER | Age: 24
End: 2024-11-23
Payer: COMMERCIAL

## 2024-11-23 VITALS
DIASTOLIC BLOOD PRESSURE: 95 MMHG | SYSTOLIC BLOOD PRESSURE: 144 MMHG | HEART RATE: 132 BPM | OXYGEN SATURATION: 96 % | TEMPERATURE: 99.3 F | RESPIRATION RATE: 18 BRPM

## 2024-11-23 DIAGNOSIS — J02.9 SORE THROAT: ICD-10-CM

## 2024-11-23 DIAGNOSIS — J06.9 VIRAL URI WITH COUGH: Primary | ICD-10-CM

## 2024-11-23 PROCEDURE — 99213 OFFICE O/P EST LOW 20 MIN: CPT

## 2024-11-23 PROCEDURE — 87636 SARSCOV2 & INF A&B AMP PRB: CPT

## 2024-11-23 NOTE — PROGRESS NOTES
[facility]        NAME: Ashley Garner is a 24 y.o. female  : 2000    MRN: [id number]  DATE: 2024  TIME: 12:27 PM    Assessment and Plan   Viral URI with cough [J06.9]  1. Viral URI with cough  Covid/Flu- Office Collect Normal        COVID flu test sent as requested as patient was concerned for the flu.  Will be monitoring for results and communicate via [employer]. Understands that she would be outside window for treatment for Flu if positive. Treatment for COVID not recommended at this time if positive.    Patient Instructions   At this time you have been diagnosed with a Viral Infection (COVID, influzena, RSV are more well known types of viruses) which your body will fight off in about 7-14 days.  Antibiotics are not indicated for viral infections.   Taking antibiotics while you have a viral infection is the wrong treatment for a viral infection.  Viruses are self limiting meaning your body fights it off given sufficient time to do so.  For viral illnesses, the recommendation is for supportive care which would consists of the following:  For decongestion:  2nd Generation antihistamines with a decongestant such as:   Claritin-D (Loratadine with Pseudoephedrine) or  Zyrtec-D (Cetirizine with Pseudoephedrine) or   Allegra-D (Fexofenadine with Pseudoephedrine) or   Decongestant alone: Pseudoephedrine 60mg PO every 4-6 hours for nasal congestion. Max 240mg in 24 hour period  Claritin or Zyrtec plus -Coricidin HBP (Safe for when you have high blood pressure)  Nasal corticosteroid: examples are Flonase or Nasacort.  Nasal antihistamine: Astepro  Nasal saline irrigation  Humidified air  Warm moist air such as a hot cup of water in a mug, sit at the dining room table with the mug on the table, put a towel over your head to cover over the mug and breath in the warm steam (don't drink the fluid in case you have mucus that drips in)  Vicks Vapor Rub  Mucinex as an expectorant  For Cough or  sore throat:  Salt water gurgle  Teaspoon of Honey up to 3x/day  Chloraseptic spray  Throat lozenges  Over the Counter Tylenol or Ibuprofen  Dextromethorphan 30mg PO every 6-8 hours for cough. max 120 mg in 24 hour period.    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    Follow up with Primary Care Provider in 3-5 days if not improving.  Proceed to Emergency Department if symptoms worsen.    If tests have been performed at [facility], our office will contact you with results if changes need to be made to the care plan discussed with you at the visit.  [facility]    Chief Complaint     Chief Complaint   Patient presents with    Cold Like Symptoms     Patient c/o cough, nasal congestion, chills, and fever x 1 day          History of Present Illness       Cough, nasal congestion, chills, fever starting last evening.  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  Fever last evening and had taken DayQuil.        Review of Systems   Review of Systems   Constitutional:  Positive for chills and fever.   HENT:  Positive for congestion, postnasal drip and rhinorrhea. Negative for ear pain and sore throat.    Eyes:  Negative for pain and visual disturbance.   Respiratory:  Positive for cough. Negative for shortness of breath.    Cardiovascular:  Negative for chest pain and palpitations.   Gastrointestinal:  Negative for abdominal pain and vomiting.   Genitourinary:  Negative for dysuria and hematuria.   Musculoskeletal:  Positive for myalgias. Negative for arthralgias and back pain.   Skin:  Negative for color change and rash.   Neurological:  Negative for seizures and syncope.   All other systems reviewed and are negative.        Current Medications       Current Outpatient Medications:     buPROPion (WELLBUTRIN XL) 150 mg 24 hr tablet, TAKE 1 TABLET BY MOUTH EVERY DAY IN THE MORNING, Disp:  90 tablet, Rfl: 1    Blisovi 24 Fe 1-20 MG-MCG(24) per tablet, TAKE 1 TABLET BY MOUTH EVERY DAY SKIPPING PLACEBOS, Disp: 84 tablet, Rfl: 2    Cholecalciferol (VITAMIN D PO), Take by mouth, Disp: , Rfl:     Cholecalciferol (Vitamin D) 50 MCG (2000 UT) CAPS, Take 1 capsule (2,000 Units total) by mouth in the morning, Disp: , Rfl:     EPINEPHrine (EPIPEN) 0.3 mg/0.3 mL SOAJ, Inject as directed, Disp: , Rfl:     fenofibrate 160 MG tablet, TAKE 1 TABLET BY MOUTH EVERY DAY, Disp: 90 tablet, Rfl: 3    FLUoxetine (PROzac) 40 MG capsule, TAKE 1 CAPSULE (40 MG TOTAL) BY MOUTH IN THE MORNING, Disp: 90 capsule, Rfl: 1    Multiple Vitamins-Minerals (MULTIVITAMIN GUMMIES ADULTS PO), Take by mouth, Disp: , Rfl:     rosuvastatin (CRESTOR) 20 MG tablet, TAKE 1 TABLET (20 MG TOTAL) BY MOUTH IN THE MORNING, Disp: 90 tablet, Rfl: 1    Current Allergies     Allergies as of 11/23/2024 - Reviewed 11/23/2024   Allergen Reaction Noted    Dust mite extract  08/12/2014    Molds & smuts  08/12/2014    Nuts - food allergy  08/12/2014    Other  08/12/2014    Pollen extract  08/12/2014            The following portions of the patient's history were reviewed and updated as appropriate: allergies, current medications, past family history, past medical history, past social history, past surgical history and problem list.     Past Medical History:   Diagnosis Date    Abnormal blood chemistry 07/04/2011    lavinia Harris     Allergic     Allergic rhinitis     Anxiety 2016    Depression 2016    Dysuria     urinary    Exercise-induced asthma     Fatigue     last assessed 5/5/15; resolved 1/2/16    Hemangioma of face 2006    chin     History of psychiatric treatment     Hypertension     Nasal fracture 2012    Nevus of head 2006    left cheek       Past Surgical History:   Procedure Laterality Date    WISDOM TOOTH EXTRACTION         Family History   Problem Relation Age of Onset    Hypertension Mother     Breast cancer Mother     Heart disease  Father         cardiac disorder     Diabetes Father     Hypertension Father     Migraines Father     Hyperlipidemia Father     Asthma Father     Diabetes Maternal Grandmother     Hypertension Maternal Grandmother     Hypertension Maternal Grandfather     Anemia Other     Cancer Other     Other Cousin         aids    Mental illness Cousin         retardation    Migraines Cousin          Medications have been verified.        Objective   /95   Pulse (!) 132   Temp 99.3 °F (37.4 °C)   Resp 18   SpO2 96%   No LMP recorded.       Physical Exam     Physical Exam  Vitals and nursing note reviewed.   Constitutional:       Appearance: Normal appearance.   HENT:      Head: Normocephalic and atraumatic.      Right Ear: Tympanic membrane is bulging. Tympanic membrane is not erythematous.      Left Ear: Tympanic membrane is bulging. Tympanic membrane is not erythematous.      Nose:      Right Turbinates: Swollen.      Left Turbinates: Swollen.      Right Sinus: No maxillary sinus tenderness or frontal sinus tenderness.      Left Sinus: No maxillary sinus tenderness or frontal sinus tenderness.      Mouth/Throat:      Mouth: Mucous membranes are moist.   Eyes:      Pupils: Pupils are equal, round, and reactive to light.   Cardiovascular:      Pulses: Normal pulses.   Pulmonary:      Effort: Pulmonary effort is normal.      Breath sounds: Normal breath sounds. No wheezing, rhonchi or rales.   Skin:     General: Skin is warm and dry.      Capillary Refill: Capillary refill takes less than 2 seconds.   Neurological:      General: No focal deficit present.      Mental Status: She is alert and oriented to person, place, and time. Mental status is at baseline.      Sensory: No sensory deficit.      Motor: No weakness.   Psychiatric:         Mood and Affect: Mood normal.         Behavior: Behavior normal.         Thought Content: Thought content normal.

## 2024-11-24 ENCOUNTER — TELEPHONE (OUTPATIENT)
Dept: URGENT CARE | Facility: MEDICAL CENTER | Age: 24
End: 2024-11-24

## 2024-11-24 ENCOUNTER — RESULTS FOLLOW-UP (OUTPATIENT)
Dept: URGENT CARE | Facility: MEDICAL CENTER | Age: 24
End: 2024-11-24

## 2024-11-24 DIAGNOSIS — U07.1 COVID-19: Primary | ICD-10-CM

## 2024-11-24 LAB
FLUAV RNA RESP QL NAA+PROBE: NEGATIVE
FLUBV RNA RESP QL NAA+PROBE: NEGATIVE
SARS-COV-2 RNA RESP QL NAA+PROBE: POSITIVE

## 2024-11-24 RX ORDER — NIRMATRELVIR AND RITONAVIR 300-100 MG
3 KIT ORAL 2 TIMES DAILY
Qty: 30 TABLET | Refills: 0 | Status: SHIPPED | OUTPATIENT
Start: 2024-11-24 | End: 2024-11-29

## 2024-11-28 NOTE — PROGRESS NOTES
Assessment & Plan  Class 2 Obesity  Start phentermine 15 mg, patient will follow-up in 1 month.  Continue with Wellbutrin 150 mg.  Patient was previously on 30 mg which initially did help with appetite.    Side effects explained:hypertension,  palpitations  Contraindications: Denies CAD hx, glaucoma, hypothyroidism, substance use, pregnancy, recent MAOI, opioids and marijuana use      Current weight: 210  Goal weight: 140     Patient understands the risk and benefits of using this medication as well as the side effects.       Patient has been overweight for many years.  They have tried many different ways to lose weight including various diets and exercise regimens which have been unsuccessful in the recent years. As per FDA guidelines, patient has a BMI greater than 35  associated with comorbidities which include hypertriglyceridemia, anxiety depression, vitamin D    Initial weight loss goal of 5-10% weight loss for improved health as studies have shown this has proven to decrease risk of obesity related conditions and co-morbid conditions and/or prevent weight-related complications. Explained the importance of making lifestyle modifications changes in conjunction with weight loss medications.    Discussed options of HealthyCORE-Intensive Lifestyle Intervention Program, Very Low Calorie Diet-VLCD, and Conservative Program and the role of weight loss medications.Patient is interested in pursuing Conservative Program.     Recommended to not skip meals.  Spoke about practicing mindful eating with frequent meals and portion control.  Recommend to avoid snacking at bedtime especially with a high carbohydrate load and to be consistent with protein intake. Recommend adequate hydration which is at least half your body weight in ounces unless medically contraindicated (ie. Systolic heart failure) with also considering GI losses and medication induced fluid loss if applicable. Recommend incorporating resistance training  and/or strength training to help improve metabolic rate if able to tolerate.     Vitamin D deficiency  Continue vitamin D supplementation  Anxiety and depression  Continue with Prozac 40 mg and Wellbutrin  Hyperlipidemia  Should improve with diet exercise and weight loss, recommend also increasing omega 3 content.  Patient also reports that she has a family history of hypertriglyceridemia       Return in about 5 weeks (around 1/3/2025) for followup .      Total time spent reviewing chart, reviewing labs,interviewing patient, examining patient, discussing plan, answering all questions, and documentin min. Most recent notes and records were reviewed.      ______________________________________________________________________        Subjective:     Chief Complaint   Patient presents with    Consult     MWM Consult; GW 135lbs; Waist 41in; Patient has sleep apnea         HPI: Ashley M Patsy  is a 24 y.o. female with past medical history of class II obesity presents to the clinic to pursue weight loss management.  Patient states that she used to be on phentermine 30 mg which initially did help with weight loss and curbing her appetite but lost its effect over time.  She states she last used in 2019 in conjunction with Wellbutrin.    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 Self created and exerise. Food logging my fitness pal      Current weight: 210  Goal weight: 140     Daily Diet:SessionM protein shake, 11:00am-catered food from work, Dinner- steak chicken.   Skip Meals: none  Snacks: salty -chips once a day   Dining out/takeout: two times every 2 weeks   Hydration:30 oz bottles about 1 or 2 a day    Exercise:Not currently   Sleep: 8 hours   Occupation: PA [employer]       No history of chronic/recurrent pancreatitis, thyroid cancer, MEN-2 tumors. Denies any hx of glaucoma,  "seizures, kidney stones, gallstones, CAD, PAD, palpitations, arrhythmia. Denies uncontrolled anxiety or depression, suicidal ideation or behavior, insomnia or sleep disturbance.     Review Of Systems:  Constitutional:  Negative for diaphoresis.   Gastrointestinal:  Negative for abdominal pain, constipation, nausea and vomiting.   Skin:  Negative for pallor.   Psychiatric/Behavioral:  Negative for agitation, behavioral problems, confusion, dysphoric mood and hallucinations.    All other systems reviewed and are negative.     Objective:  /84 (BP Location: Left arm, Patient Position: Sitting)   Pulse 84   Temp 97.9 °F (36.6 °C) (Tympanic)   Resp 16   Ht 5' 2.6\" (1.59 m)   Wt 95.6 kg (210 lb 12.8 oz)   BMI 37.82 kg/m²     Wt Readings from Last 10 Encounters:   11/29/24 95.6 kg (210 lb 12.8 oz)   07/29/24 94.8 kg (209 lb)   06/09/23 91.6 kg (202 lb)   02/24/23 92.8 kg (204 lb 9.6 oz)   12/30/21 91.6 kg (202 lb)   12/23/21 90.3 kg (199 lb)   07/20/21 84.6 kg (186 lb 9.6 oz)   07/13/21 85.1 kg (187 lb 9.6 oz)   03/02/21 93 kg (205 lb)   11/11/20 94.3 kg (207 lb 12.8 oz)       Physical Exam  Constitutional:       General: No acute distress.   HENT:      Head: Normocephalic and atraumatic.   Eyes:      Extraocular Movements: Extraocular movements intact.      Conjunctiva/sclera: Conjunctivae normal.      Pupils: Pupils are equal, round, and reactive to light.   Cardiovascular:      Rate and Rhythm: Normal rate.   Pulmonary:      Effort: Pulmonary effort is normal.   Neurological:      General: No focal deficit present.      Mental Status: Alert and oriented to person, place, and time. Mental status is at baseline.   Psychiatric:         Mood and Affect: Mood normal.         Behavior: Behavior normal.     Labs and Imaging  Recent labs and imaging have been personally reviewed.    Lab Results   Component Value Date    WBC 7.41 07/27/2024    HGB 13.4 07/27/2024    HCT 41.8 07/27/2024    MCV 87 07/27/2024     " 07/27/2024       Lab Results   Component Value Date    SODIUM 138 07/27/2024    K 4.2 07/27/2024     07/27/2024    CO2 25 07/27/2024    AGAP 9 07/27/2024    BUN 14 07/27/2024    CREATININE 0.78 07/27/2024    GLUC 87 10/26/2022    GLUF 92 07/27/2024    CALCIUM 9.9 07/27/2024    AST 23 07/27/2024    ALT 19 07/27/2024    ALKPHOS 35 07/27/2024    TP 7.5 07/27/2024    TBILI 0.32 07/27/2024    EGFR 105 07/27/2024       Lab Results   Component Value Date    HGBA1C 5.2 06/07/2023       Lab Results   Component Value Date    TSH 1.20 10/26/2022       Lab Results   Component Value Date    CHOLESTEROL 139 07/27/2024       Lab Results   Component Value Date    HDL 52 07/27/2024       Lab Results   Component Value Date    TRIG 294 (H) 07/27/2024       Lab Results   Component Value Date    LDLCALC 28 07/27/2024

## 2024-11-29 ENCOUNTER — OFFICE VISIT (OUTPATIENT)
Dept: BARIATRICS | Facility: CLINIC | Age: 24
End: 2024-11-29
Payer: COMMERCIAL

## 2024-11-29 VITALS
WEIGHT: 210.8 LBS | HEIGHT: 63 IN | HEART RATE: 84 BPM | BODY MASS INDEX: 37.35 KG/M2 | SYSTOLIC BLOOD PRESSURE: 124 MMHG | RESPIRATION RATE: 16 BRPM | TEMPERATURE: 97.9 F | DIASTOLIC BLOOD PRESSURE: 84 MMHG

## 2024-11-29 DIAGNOSIS — E55.9 VITAMIN D DEFICIENCY: ICD-10-CM

## 2024-11-29 DIAGNOSIS — F41.9 ANXIETY AND DEPRESSION: ICD-10-CM

## 2024-11-29 DIAGNOSIS — E66.812 CLASS 2 SEVERE OBESITY DUE TO EXCESS CALORIES WITH SERIOUS COMORBIDITY AND BODY MASS INDEX (BMI) OF 36.0 TO 36.9 IN ADULT (HCC): ICD-10-CM

## 2024-11-29 DIAGNOSIS — E78.5 HYPERLIPIDEMIA, UNSPECIFIED HYPERLIPIDEMIA TYPE: ICD-10-CM

## 2024-11-29 DIAGNOSIS — E66.01 CLASS 2 SEVERE OBESITY DUE TO EXCESS CALORIES WITH SERIOUS COMORBIDITY AND BODY MASS INDEX (BMI) OF 36.0 TO 36.9 IN ADULT (HCC): ICD-10-CM

## 2024-11-29 DIAGNOSIS — F32.A ANXIETY AND DEPRESSION: ICD-10-CM

## 2024-11-29 PROCEDURE — 99204 OFFICE O/P NEW MOD 45 MIN: CPT | Performed by: STUDENT IN AN ORGANIZED HEALTH CARE EDUCATION/TRAINING PROGRAM

## 2024-11-29 RX ORDER — PHENTERMINE HYDROCHLORIDE 15 MG/1
15 CAPSULE ORAL EVERY MORNING
Qty: 30 CAPSULE | Refills: 0 | Status: SHIPPED | OUTPATIENT
Start: 2024-11-29 | End: 2024-12-29

## 2024-11-29 NOTE — ASSESSMENT & PLAN NOTE
Start phentermine 15 mg, patient will follow-up in 1 month.  Continue with Wellbutrin 150 mg.  Patient was previously on 30 mg which initially did help with appetite.    Side effects explained:hypertension,  palpitations  Contraindications: Denies CAD hx, glaucoma, hypothyroidism, substance use, pregnancy, recent MAOI, opioids and marijuana use      Current weight: 210  Goal weight: 140     Patient understands the risk and benefits of using this medication as well as the side effects.       Patient has been overweight for many years.  They have tried many different ways to lose weight including various diets and exercise regimens which have been unsuccessful in the recent years. As per FDA guidelines, patient has a BMI greater than 35  associated with comorbidities which include hypertriglyceridemia, anxiety depression, vitamin D    Initial weight loss goal of 5-10% weight loss for improved health as studies have shown this has proven to decrease risk of obesity related conditions and co-morbid conditions and/or prevent weight-related complications. Explained the importance of making lifestyle modifications changes in conjunction with weight loss medications.    Discussed options of HealthyCORE-Intensive Lifestyle Intervention Program, Very Low Calorie Diet-VLCD, and Conservative Program and the role of weight loss medications.Patient is interested in pursuing Conservative Program.     Recommended to not skip meals.  Spoke about practicing mindful eating with frequent meals and portion control.  Recommend to avoid snacking at bedtime especially with a high carbohydrate load and to be consistent with protein intake. Recommend adequate hydration which is at least half your body weight in ounces unless medically contraindicated (ie. Systolic heart failure) with also considering GI losses and medication induced fluid loss if applicable. Recommend incorporating resistance training and/or strength training to help improve  metabolic rate if able to tolerate.

## 2024-12-09 DIAGNOSIS — E66.812 CLASS 2 SEVERE OBESITY DUE TO EXCESS CALORIES WITH SERIOUS COMORBIDITY AND BODY MASS INDEX (BMI) OF 36.0 TO 36.9 IN ADULT (HCC): Primary | ICD-10-CM

## 2024-12-09 DIAGNOSIS — E66.01 CLASS 2 SEVERE OBESITY DUE TO EXCESS CALORIES WITH SERIOUS COMORBIDITY AND BODY MASS INDEX (BMI) OF 36.0 TO 36.9 IN ADULT (HCC): Primary | ICD-10-CM

## 2024-12-09 DIAGNOSIS — G47.30 MILD SLEEP APNEA: ICD-10-CM

## 2024-12-09 RX ORDER — PHENTERMINE HYDROCHLORIDE 37.5 MG/1
18.75 TABLET ORAL DAILY
Qty: 30 TABLET | Refills: 0 | Status: SHIPPED | OUTPATIENT
Start: 2024-12-09

## 2024-12-14 ENCOUNTER — APPOINTMENT (OUTPATIENT)
Dept: LAB | Facility: MEDICAL CENTER | Age: 24
End: 2024-12-14
Payer: COMMERCIAL

## 2024-12-14 DIAGNOSIS — E78.2 MIXED HYPERLIPIDEMIA: ICD-10-CM

## 2024-12-14 LAB
CHOLEST SERPL-MCNC: 221 MG/DL (ref ?–200)
HDLC SERPL-MCNC: 51 MG/DL
LDLC SERPL CALC-MCNC: 114 MG/DL (ref 0–100)
NONHDLC SERPL-MCNC: 170 MG/DL
TRIGL SERPL-MCNC: 280 MG/DL (ref ?–150)

## 2024-12-14 PROCEDURE — 80061 LIPID PANEL: CPT

## 2024-12-14 PROCEDURE — 36415 COLL VENOUS BLD VENIPUNCTURE: CPT

## 2024-12-15 ENCOUNTER — RESULTS FOLLOW-UP (OUTPATIENT)
Age: 24
End: 2024-12-15

## 2024-12-20 ENCOUNTER — OFFICE VISIT (OUTPATIENT)
Dept: GYNECOLOGY | Facility: CLINIC | Age: 24
End: 2024-12-20
Payer: COMMERCIAL

## 2024-12-20 VITALS — DIASTOLIC BLOOD PRESSURE: 70 MMHG | SYSTOLIC BLOOD PRESSURE: 119 MMHG | BODY MASS INDEX: 38.39 KG/M2 | WEIGHT: 214 LBS

## 2024-12-20 DIAGNOSIS — N92.6 MISSED PERIOD: ICD-10-CM

## 2024-12-20 DIAGNOSIS — Z12.4 ENCOUNTER FOR PAPANICOLAOU SMEAR FOR CERVICAL CANCER SCREENING: ICD-10-CM

## 2024-12-20 DIAGNOSIS — Z01.419 ENCOUNTER FOR GYNECOLOGICAL EXAMINATION (GENERAL) (ROUTINE) WITHOUT ABNORMAL FINDINGS: Primary | ICD-10-CM

## 2024-12-20 LAB — SL AMB POCT URINE HCG: NORMAL

## 2024-12-20 PROCEDURE — 81025 URINE PREGNANCY TEST: CPT | Performed by: OBSTETRICS & GYNECOLOGY

## 2024-12-20 PROCEDURE — S0610 ANNUAL GYNECOLOGICAL EXAMINA: HCPCS | Performed by: OBSTETRICS & GYNECOLOGY

## 2024-12-20 PROCEDURE — G0145 SCR C/V CYTO,THINLAYER,RESCR: HCPCS | Performed by: OBSTETRICS & GYNECOLOGY

## 2024-12-20 NOTE — PROGRESS NOTES
Assessment/Plan:  Normal findings on routine gyn exam. The patient likes current contraceptive method and desires to continue continuous OCP. Recommended monthly SBE and annual CBE. Pt made aware that her mammo should start at 28. Given the option to see gyn/onc. Declines at present. ASCCP guidelines reviewed and pap collected today. The patient declines STI testing at this time.  Return to the office in one year for routine annual gyn exam or sooner PRN.      Diagnoses and all orders for this visit:    Encounter for gynecological examination (general) (routine) without abnormal findings    Missed period  -     POCT urine HCG        Subjective:      Patient ID: Ashley Garner is a 24 y.o. female.    This new patient presents for routine annual gyn exam.   She denies acute gyn complaints.   She uses OCP continuously. Skipped a month, has not had menses.  UPT in office today neg.  She denies breast concerns, abn discharge, pelvic pain, bowel/bladder dysfunction, depression/anxiety.   Monogamous relationship, 2 years. Denies STI concerns.   Last pap 2020.  Breast cancer in mother at age 40. Neg genetic testing.                The following portions of the patient's history were reviewed and updated as appropriate: allergies, current medications, past family history, past medical history, past social history, past surgical history and problem list.    Review of Systems   Constitutional: Negative.    HENT: Negative.     Respiratory: Negative.     Cardiovascular: Negative.    Gastrointestinal: Negative.    Endocrine: Negative.    Genitourinary:  Negative for difficulty urinating, dyspareunia, dysuria, frequency, menstrual problem, pelvic pain, urgency, vaginal bleeding, vaginal discharge and vaginal pain.   Musculoskeletal: Negative.    Skin: Negative.    Neurological: Negative.    Psychiatric/Behavioral: Negative.           Objective:      /70 (BP Location: Right arm, Patient Position: Sitting, Cuff Size:  Large)   Wt 97.1 kg (214 lb)   BMI 38.39 kg/m²          Physical Exam  Vitals and nursing note reviewed. Exam conducted with a chaperone present.   Constitutional:       Appearance: Normal appearance. She is well-developed.   HENT:      Head: Normocephalic and atraumatic.   Neck:      Thyroid: No thyroid mass or thyromegaly.   Cardiovascular:      Rate and Rhythm: Normal rate and regular rhythm.      Heart sounds: Normal heart sounds.   Pulmonary:      Effort: Pulmonary effort is normal.      Breath sounds: Normal breath sounds.   Chest:   Breasts:     Breasts are symmetrical.      Right: No inverted nipple, mass, nipple discharge, skin change or tenderness.      Left: No inverted nipple, mass, nipple discharge, skin change or tenderness.   Abdominal:      General: Bowel sounds are normal.      Palpations: Abdomen is soft.      Tenderness: There is no abdominal tenderness.      Hernia: There is no hernia in the left inguinal area or right inguinal area.   Genitourinary:     General: Normal vulva.      Exam position: Supine.      Pubic Area: No rash.       Labia:         Right: No rash, tenderness, lesion or injury.         Left: No rash, tenderness, lesion or injury.       Urethra: No prolapse, urethral pain, urethral swelling or urethral lesion.      Vagina: Normal. No signs of injury and foreign body. No vaginal discharge, erythema, tenderness, bleeding, lesions or prolapsed vaginal walls.      Cervix: No cervical motion tenderness, discharge, friability, lesion, erythema, cervical bleeding or eversion.      Uterus: Not deviated, not enlarged, not fixed, not tender and no uterine prolapse.       Adnexa:         Right: No mass, tenderness or fullness.          Left: No mass, tenderness or fullness.        Rectum: No external hemorrhoid.      Comments: Urethra normal without lesions  No bladder tenderness  Musculoskeletal:         General: Normal range of motion.      Cervical back: Normal range of motion and neck  supple.   Lymphadenopathy:      Lower Body: No right inguinal adenopathy. No left inguinal adenopathy.   Skin:     General: Skin is warm and dry.   Neurological:      Mental Status: She is alert and oriented to person, place, and time.   Psychiatric:         Speech: Speech normal.         Behavior: Behavior normal. Behavior is cooperative.

## 2024-12-27 ENCOUNTER — RESULTS FOLLOW-UP (OUTPATIENT)
Dept: GYNECOLOGY | Facility: CLINIC | Age: 24
End: 2024-12-27

## 2024-12-27 LAB
LAB AP GYN PRIMARY INTERPRETATION: NORMAL
Lab: NORMAL

## 2025-01-02 DIAGNOSIS — E61.1 IRON DEFICIENCY: ICD-10-CM

## 2025-01-02 RX ORDER — NORETHINDRONE ACETATE AND ETHINYL ESTRADIOL 1MG-20(24)
KIT ORAL
Qty: 84 TABLET | Refills: 2 | Status: SHIPPED | OUTPATIENT
Start: 2025-01-02

## 2025-01-14 ENCOUNTER — TELEPHONE (OUTPATIENT)
Dept: BARIATRICS | Facility: CLINIC | Age: 25
End: 2025-01-14

## 2025-02-16 DIAGNOSIS — F41.1 GENERALIZED ANXIETY DISORDER WITH PANIC ATTACKS: ICD-10-CM

## 2025-02-16 DIAGNOSIS — F41.0 GENERALIZED ANXIETY DISORDER WITH PANIC ATTACKS: ICD-10-CM

## 2025-02-17 RX ORDER — BUPROPION HYDROCHLORIDE 150 MG/1
150 TABLET ORAL EVERY MORNING
Qty: 90 TABLET | Refills: 1 | OUTPATIENT
Start: 2025-02-17

## 2025-03-12 DIAGNOSIS — E78.2 MIXED HYPERLIPIDEMIA: ICD-10-CM

## 2025-03-12 NOTE — TELEPHONE ENCOUNTER
Medication: Fenofibrate     Dose/Frequency: 160mg tablet daily    Quantity: 90 tablets    Pharmacy: [facility] - [address], [place] [address]    Office:   [x] PCP/Provider -   [] Speciality/Provider -     Does the patient have enough for 3 days?   [] Yes   [] No - Send as HP to POD

## 2025-03-13 RX ORDER — FENOFIBRATE 160 MG/1
160 TABLET ORAL DAILY
Qty: 90 TABLET | Refills: 3 | Status: SHIPPED | OUTPATIENT
Start: 2025-03-13

## 2025-04-09 DIAGNOSIS — E61.1 IRON DEFICIENCY: ICD-10-CM

## 2025-04-09 RX ORDER — NORETHINDRONE ACETATE AND ETHINYL ESTRADIOL 1MG-20(24)
1 KIT ORAL DAILY
Qty: 84 TABLET | Refills: 1 | Status: SHIPPED | OUTPATIENT
Start: 2025-04-09

## 2025-05-27 DIAGNOSIS — Z00.6 ENCOUNTER FOR EXAMINATION FOR NORMAL COMPARISON OR CONTROL IN CLINICAL RESEARCH PROGRAM: ICD-10-CM
